# Patient Record
Sex: FEMALE | Race: WHITE
[De-identification: names, ages, dates, MRNs, and addresses within clinical notes are randomized per-mention and may not be internally consistent; named-entity substitution may affect disease eponyms.]

---

## 2018-12-04 ENCOUNTER — HOSPITAL ENCOUNTER (EMERGENCY)
Dept: HOSPITAL 91 - E/R | Age: 58
Discharge: HOME | End: 2018-12-04
Payer: COMMERCIAL

## 2018-12-04 ENCOUNTER — HOSPITAL ENCOUNTER (EMERGENCY)
Age: 58
Discharge: HOME | End: 2018-12-04

## 2018-12-04 DIAGNOSIS — C18.9: ICD-10-CM

## 2018-12-04 DIAGNOSIS — K80.50: Primary | ICD-10-CM

## 2018-12-04 LAB
ADD MAN DIFF?: NO
ALANINE AMINOTRANSFERASE: 63 IU/L (ref 13–69)
ALBUMIN/GLOBULIN RATIO: 1.1
ALBUMIN: 4.4 G/DL (ref 3.3–4.9)
ALKALINE PHOSPHATASE: 107 IU/L (ref 42–121)
ANION GAP: 13 (ref 5–13)
ASPARTATE AMINO TRANSFERASE: 61 IU/L (ref 15–46)
BASOPHIL #: 0.1 10^3/UL (ref 0–0.1)
BASOPHILS %: 0.9 % (ref 0–2)
BILIRUBIN,DIRECT: 0 MG/DL (ref 0–0.2)
BILIRUBIN,TOTAL: 0.8 MG/DL (ref 0.2–1.3)
BLOOD UREA NITROGEN: 10 MG/DL (ref 7–20)
CALCIUM: 9.1 MG/DL (ref 8.4–10.2)
CARBON DIOXIDE: 23 MMOL/L (ref 21–31)
CHLORIDE: 99 MMOL/L (ref 97–110)
CREATININE: 0.66 MG/DL (ref 0.44–1)
EOSINOPHILS #: 0.4 10^3/UL (ref 0–0.5)
EOSINOPHILS %: 3.7 % (ref 0–7)
GLOBULIN: 4 G/DL (ref 1.3–3.2)
GLUCOSE: 143 MG/DL (ref 70–220)
HEMATOCRIT: 42.2 % (ref 37–47)
HEMOGLOBIN: 14.3 G/DL (ref 12–16)
IMMATURE GRANS #M: 0.03 10^3/UL (ref 0–0.03)
IMMATURE GRANS % (M): 0.3 % (ref 0–0.43)
LIPASE: 123 U/L (ref 23–300)
LYMPHOCYTES #: 2.1 10^3/UL (ref 0.8–2.9)
LYMPHOCYTES %: 21.1 % (ref 15–51)
MEAN CORPUSCULAR HEMOGLOBIN: 28 PG (ref 29–33)
MEAN CORPUSCULAR HGB CONC: 33.9 G/DL (ref 32–37)
MEAN CORPUSCULAR VOLUME: 82.7 FL (ref 82–101)
MEAN PLATELET VOLUME: 10.3 FL (ref 7.4–10.4)
MONOCYTE #: 1.2 10^3/UL (ref 0.3–0.9)
MONOCYTES %: 11.9 % (ref 0–11)
NEUTROPHIL #: 6.2 10^3/UL (ref 1.6–7.5)
NEUTROPHILS %: 62.1 % (ref 39–77)
NUCLEATED RED BLOOD CELLS #: 0 10^3/UL (ref 0–0)
NUCLEATED RED BLOOD CELLS%: 0 /100WBC (ref 0–0)
PLATELET COUNT: 308 10^3/UL (ref 140–415)
POTASSIUM: 4.4 MMOL/L (ref 3.5–5.1)
RED BLOOD COUNT: 5.1 10^6/UL (ref 4.2–5.4)
RED CELL DISTRIBUTION WIDTH: 15.3 % (ref 11.5–14.5)
SODIUM: 135 MMOL/L (ref 135–144)
TOTAL PROTEIN: 8.4 G/DL (ref 6.1–8.1)
URINE PH (DIP) POC: 6 (ref 5–8.5)
WHITE BLOOD COUNT: 10 10^3/UL (ref 4.8–10.8)

## 2018-12-04 PROCEDURE — 85025 COMPLETE CBC W/AUTO DIFF WBC: CPT

## 2018-12-04 PROCEDURE — 80053 COMPREHEN METABOLIC PANEL: CPT

## 2018-12-04 PROCEDURE — 99285 EMERGENCY DEPT VISIT HI MDM: CPT

## 2018-12-04 PROCEDURE — 96374 THER/PROPH/DIAG INJ IV PUSH: CPT

## 2018-12-04 PROCEDURE — 96375 TX/PRO/DX INJ NEW DRUG ADDON: CPT

## 2018-12-04 PROCEDURE — 81003 URINALYSIS AUTO W/O SCOPE: CPT

## 2018-12-04 PROCEDURE — 74176 CT ABD & PELVIS W/O CONTRAST: CPT

## 2018-12-04 PROCEDURE — 36415 COLL VENOUS BLD VENIPUNCTURE: CPT

## 2018-12-04 PROCEDURE — 83690 ASSAY OF LIPASE: CPT

## 2018-12-04 RX ADMIN — MORPHINE SULFATE 1 MG: 2 INJECTION, SOLUTION INTRAMUSCULAR; INTRAVENOUS at 23:29

## 2018-12-04 RX ADMIN — MORPHINE SULFATE 1 MG: 2 INJECTION, SOLUTION INTRAMUSCULAR; INTRAVENOUS at 21:56

## 2018-12-04 RX ADMIN — ONDANSETRON HYDROCHLORIDE 1 MG: 2 INJECTION, SOLUTION INTRAMUSCULAR; INTRAVENOUS at 21:56

## 2019-01-05 ENCOUNTER — HOSPITAL ENCOUNTER (INPATIENT)
Dept: HOSPITAL 91 - 6WM | Age: 59
LOS: 5 days | Discharge: HOME HEALTH | DRG: 25 | End: 2019-01-10
Payer: COMMERCIAL

## 2019-01-05 ENCOUNTER — HOSPITAL ENCOUNTER (INPATIENT)
Dept: HOSPITAL 10 - E/R | Age: 59
LOS: 5 days | Discharge: HOME HEALTH | DRG: 25 | End: 2019-01-10
Attending: LEGAL MEDICINE | Admitting: LEGAL MEDICINE
Payer: COMMERCIAL

## 2019-01-05 VITALS — RESPIRATION RATE: 17 BRPM | DIASTOLIC BLOOD PRESSURE: 67 MMHG | HEART RATE: 74 BPM | SYSTOLIC BLOOD PRESSURE: 143 MMHG

## 2019-01-05 VITALS — SYSTOLIC BLOOD PRESSURE: 141 MMHG | RESPIRATION RATE: 19 BRPM | DIASTOLIC BLOOD PRESSURE: 69 MMHG | HEART RATE: 77 BPM

## 2019-01-05 VITALS
BODY MASS INDEX: 30.02 KG/M2 | BODY MASS INDEX: 30.02 KG/M2 | HEIGHT: 62 IN | WEIGHT: 163.14 LBS | HEIGHT: 62 IN | WEIGHT: 163.14 LBS

## 2019-01-05 VITALS — HEART RATE: 83 BPM

## 2019-01-05 VITALS — HEART RATE: 88 BPM

## 2019-01-05 VITALS — DIASTOLIC BLOOD PRESSURE: 69 MMHG | HEART RATE: 84 BPM | SYSTOLIC BLOOD PRESSURE: 145 MMHG | RESPIRATION RATE: 18 BRPM

## 2019-01-05 VITALS — HEART RATE: 92 BPM

## 2019-01-05 DIAGNOSIS — R47.01: ICD-10-CM

## 2019-01-05 DIAGNOSIS — G93.6: ICD-10-CM

## 2019-01-05 DIAGNOSIS — C18.9: ICD-10-CM

## 2019-01-05 DIAGNOSIS — C78.00: ICD-10-CM

## 2019-01-05 DIAGNOSIS — C78.7: ICD-10-CM

## 2019-01-05 DIAGNOSIS — C79.31: Primary | ICD-10-CM

## 2019-01-05 DIAGNOSIS — G81.94: ICD-10-CM

## 2019-01-05 LAB
ADD MAN DIFF?: NO
ALANINE AMINOTRANSFERASE: 40 IU/L (ref 13–69)
ALBUMIN/GLOBULIN RATIO: 1.13
ALBUMIN: 4.2 G/DL (ref 3.3–4.9)
ALKALINE PHOSPHATASE: 80 IU/L (ref 42–121)
ANION GAP: 10 (ref 5–13)
ASPARTATE AMINO TRANSFERASE: 45 IU/L (ref 15–46)
BASOPHIL #: 0.1 10^3/UL (ref 0–0.1)
BASOPHILS %: 0.7 % (ref 0–2)
BILIRUBIN,DIRECT: 0 MG/DL (ref 0–0.2)
BILIRUBIN,TOTAL: 0.2 MG/DL (ref 0.2–1.3)
BLOOD UREA NITROGEN: 6 MG/DL (ref 7–20)
CALCIUM: 8.7 MG/DL (ref 8.4–10.2)
CARBON DIOXIDE: 30 MMOL/L (ref 21–31)
CHLORIDE: 100 MMOL/L (ref 97–110)
CREATININE: 0.7 MG/DL (ref 0.44–1)
EOSINOPHILS #: 0.4 10^3/UL (ref 0–0.5)
EOSINOPHILS %: 5.7 % (ref 0–7)
GLOBULIN: 3.7 G/DL (ref 1.3–3.2)
GLUCOSE: 134 MG/DL (ref 70–220)
HEMATOCRIT: 40.5 % (ref 37–47)
HEMOGLOBIN: 13.4 G/DL (ref 12–16)
IMMATURE GRANS #M: 0.02 10^3/UL (ref 0–0.03)
IMMATURE GRANS % (M): 0.3 % (ref 0–0.43)
INR: 1.02
LYMPHOCYTES #: 1.3 10^3/UL (ref 0.8–2.9)
LYMPHOCYTES %: 17.8 % (ref 15–51)
MEAN CORPUSCULAR HEMOGLOBIN: 28.3 PG (ref 29–33)
MEAN CORPUSCULAR HGB CONC: 33.1 G/DL (ref 32–37)
MEAN CORPUSCULAR VOLUME: 85.6 FL (ref 82–101)
MEAN PLATELET VOLUME: 10.2 FL (ref 7.4–10.4)
MONOCYTE #: 0.8 10^3/UL (ref 0.3–0.9)
MONOCYTES %: 11.3 % (ref 0–11)
NEUTROPHIL #: 4.8 10^3/UL (ref 1.6–7.5)
NEUTROPHILS %: 64.2 % (ref 39–77)
NUCLEATED RED BLOOD CELLS #: 0 10^3/UL (ref 0–0)
NUCLEATED RED BLOOD CELLS%: 0 /100WBC (ref 0–0)
PLATELET COUNT: 307 10^3/UL (ref 140–415)
POTASSIUM: 4.3 MMOL/L (ref 3.5–5.1)
PROTIME: 13.5 SEC (ref 11.9–14.9)
PT RATIO: 1.1
RED BLOOD COUNT: 4.73 10^6/UL (ref 4.2–5.4)
RED CELL DISTRIBUTION WIDTH: 15.1 % (ref 11.5–14.5)
SODIUM: 140 MMOL/L (ref 135–144)
TOTAL PROTEIN: 7.9 G/DL (ref 6.1–8.1)
TROPONIN-I: < 0.012 NG/ML (ref 0–0.12)
WHITE BLOOD COUNT: 7.4 10^3/UL (ref 4.8–10.8)

## 2019-01-05 PROCEDURE — 84100 ASSAY OF PHOSPHORUS: CPT

## 2019-01-05 PROCEDURE — 84484 ASSAY OF TROPONIN QUANT: CPT

## 2019-01-05 PROCEDURE — 85610 PROTHROMBIN TIME: CPT

## 2019-01-05 PROCEDURE — 74177 CT ABD & PELVIS W/CONTRAST: CPT

## 2019-01-05 PROCEDURE — 93005 ELECTROCARDIOGRAM TRACING: CPT

## 2019-01-05 PROCEDURE — 85730 THROMBOPLASTIN TIME PARTIAL: CPT

## 2019-01-05 PROCEDURE — 88313 SPECIAL STAINS GROUP 2: CPT

## 2019-01-05 PROCEDURE — 70498 CT ANGIOGRAPHY NECK: CPT

## 2019-01-05 PROCEDURE — C1713 ANCHOR/SCREW BN/BN,TIS/BN: HCPCS

## 2019-01-05 PROCEDURE — 99285 EMERGENCY DEPT VISIT HI MDM: CPT

## 2019-01-05 PROCEDURE — 87081 CULTURE SCREEN ONLY: CPT

## 2019-01-05 PROCEDURE — 36415 COLL VENOUS BLD VENIPUNCTURE: CPT

## 2019-01-05 PROCEDURE — 93306 TTE W/DOPPLER COMPLETE: CPT

## 2019-01-05 PROCEDURE — 70551 MRI BRAIN STEM W/O DYE: CPT

## 2019-01-05 PROCEDURE — 97161 PT EVAL LOW COMPLEX 20 MIN: CPT

## 2019-01-05 PROCEDURE — 88342 IMHCHEM/IMCYTCHM 1ST ANTB: CPT

## 2019-01-05 PROCEDURE — 71260 CT THORAX DX C+: CPT

## 2019-01-05 PROCEDURE — 82962 GLUCOSE BLOOD TEST: CPT

## 2019-01-05 PROCEDURE — 70553 MRI BRAIN STEM W/O & W/DYE: CPT

## 2019-01-05 PROCEDURE — 70450 CT HEAD/BRAIN W/O DYE: CPT

## 2019-01-05 PROCEDURE — 83735 ASSAY OF MAGNESIUM: CPT

## 2019-01-05 PROCEDURE — 88341 IMHCHEM/IMCYTCHM EA ADD ANTB: CPT

## 2019-01-05 PROCEDURE — 70552 MRI BRAIN STEM W/DYE: CPT

## 2019-01-05 PROCEDURE — 86900 BLOOD TYPING SEROLOGIC ABO: CPT

## 2019-01-05 PROCEDURE — 97164 PT RE-EVAL EST PLAN CARE: CPT

## 2019-01-05 PROCEDURE — 88307 TISSUE EXAM BY PATHOLOGIST: CPT

## 2019-01-05 PROCEDURE — 80048 BASIC METABOLIC PNL TOTAL CA: CPT

## 2019-01-05 PROCEDURE — 88331 PATH CONSLTJ SURG 1 BLK 1SPC: CPT

## 2019-01-05 PROCEDURE — 80053 COMPREHEN METABOLIC PANEL: CPT

## 2019-01-05 PROCEDURE — 85025 COMPLETE CBC W/AUTO DIFF WBC: CPT

## 2019-01-05 PROCEDURE — 86901 BLOOD TYPING SEROLOGIC RH(D): CPT

## 2019-01-05 PROCEDURE — 93970 EXTREMITY STUDY: CPT

## 2019-01-05 PROCEDURE — 97530 THERAPEUTIC ACTIVITIES: CPT

## 2019-01-05 PROCEDURE — 70496 CT ANGIOGRAPHY HEAD: CPT

## 2019-01-05 PROCEDURE — C9113 INJ PANTOPRAZOLE SODIUM, VIA: HCPCS

## 2019-01-05 RX ADMIN — LEVETIRACETAM SCH MLS/HR: 5 INJECTION INTRAVENOUS at 21:49

## 2019-01-05 RX ADMIN — ACETAMINOPHEN 1 MG: 325 TABLET, FILM COATED ORAL at 18:35

## 2019-01-05 RX ADMIN — LEVETIRACETAM 1 MLS/HR: 5 INJECTION INTRAVENOUS at 21:49

## 2019-01-05 RX ADMIN — VASOPRESSIN 1 ML/S: 20 INJECTION, SOLUTION INTRAMUSCULAR; SUBCUTANEOUS at 12:44

## 2019-01-05 RX ADMIN — DEXAMETHASONE SODIUM PHOSPHATE 1 MG: 4 INJECTION, SOLUTION INTRAMUSCULAR; INTRAVENOUS at 18:35

## 2019-01-05 RX ADMIN — SODIUM CHLORIDE 1 ML: 9 INJECTION, SOLUTION INTRAMUSCULAR; INTRAVENOUS; SUBCUTANEOUS at 12:44

## 2019-01-05 RX ADMIN — DEXAMETHASONE SODIUM PHOSPHATE SCH MG: 4 INJECTION, SOLUTION INTRAMUSCULAR; INTRAVENOUS at 18:35

## 2019-01-05 RX ADMIN — IOHEXOL 1 ML/S: 350 INJECTION, SOLUTION INTRAVENOUS at 12:45

## 2019-01-05 NOTE — ERD
ER Documentation


Chief Complaint


Chief Complaint





ha and confusion x 2 to 3 wks. stop chemo ~ 1wks ago. hx of colon ca





HPI


This is a 58-year-old female with a prior history of colon cancer, also with 


history of colostomy bag, who presents with 2 weeks of memory loss and word 


finding difficulty associated with a headache.  She denies head trauma, she has 


not on any blood thinners, she denies vomiting.  There are no alleviating or a


ggravating factors.





ROS


All systems reviewed and are negative except as per history of present illness.





Medications


Home Meds


Discontinued Reported Medications


Multivitamins* (Theragran*) 1 Tab Tab, 1 TAB PO DAILY, TAB


   12/4/18


Regorafenib (STIVARGA) 40 Mg Tablet, 40 MG PO, TAB


   12/4/18


Discontinued Scripts


Hydrocodone/Acetaminophen (Norco 5-325 Tablet) 1 Each Tablet, 1 TAB PO Q6H PRN 


for PAIN, #7 TAB


   Prov:TAB SALGADO MD         12/4/18





Allergies


Allergies:  


Coded Allergies:  


     No Known Allergy (Unverified , 1/5/19)





PMhx/Soc


History of Surgery:  Yes (TAHBSO, Ileostomy)


Anesthesia Reaction:  No


Hx Neurological Disorder:  No


Hx Respiratory Disorders:  Yes (Lung Cancer)


Hx Cardiac Disorders:  No


Hx Psychiatric Problems:  No


Hx Miscellaneous Medical Probl:  Yes (Liver & Colon Cancer)


Hx Alcohol Use:  No


Hx Substance Use:  No


Hx Tobacco Use:  No


Smoking Status:  Never smoker





Physical Exam


Vitals





Vital Signs


  Date      Temp  Pulse  Resp  B/P (MAP)   Pulse Ox  O2          O2 Flow    FiO2


Time                                                 Delivery    Rate


    1/5/19  98.0     86    16      146/75        98  Room Air


     13:12                           (98)


    1/5/19  97.4     71    18      138/74        98


     10:58                           (95)





Physical Exam


Const:   No acute distress


Head:   Atraumatic 


Eyes:    Normal Conjunctiva


ENT:    Normal External Ears, Nose and Mouth.


Neck:               Full range of motion. No meningismus.


Resp:   Clear to auscultation bilaterally


Cardio:   Regular rate and rhythm, no murmurs


Abd:    Soft, non tender, non distended. Normal bowel sounds


Skin:   No petechiae or rashes


Back:   No midline or flank tenderness


Ext:    No cyanosis, or edema


Neur:   Awake and alert, cranial nerves II through XII intact, no cerebellar 


ataxia, patient has difficulty with memory recently


Psych:    Normal Mood and Affect


Result Diagram:  


1/5/19 1151                                                                     


          1/5/19 1151





Results 24 hrs





Laboratory Tests


              Test
                                  1/5/19
11:51


              White Blood Count                      7.4 10^3/ul


              Red Blood Count                       4.73 10^6/ul


              Hemoglobin                               13.4 g/dl


              Hematocrit                                  40.5 %


              Mean Corpuscular Volume                    85.6 fl


              Mean Corpuscular Hemoglobin                28.3 pg


              Mean Corpuscular Hemoglobin
Concent     33.1 g/dl 



              Red Cell Distribution Width                 15.1 %


              Platelet Count                         307 10^3/UL


              Mean Platelet Volume                       10.2 fl


              Immature Granulocytes %                    0.300 %


              Neutrophils %                               64.2 %


              Lymphocytes %                               17.8 %


              Monocytes %                                 11.3 %


              Eosinophils %                                5.7 %


              Basophils %                                  0.7 %


              Nucleated Red Blood Cells %            0.0 /100WBC


              Immature Granulocytes #              0.020 10^3/ul


              Neutrophils #                          4.8 10^3/ul


              Lymphocytes #                          1.3 10^3/ul


              Monocytes #                            0.8 10^3/ul


              Eosinophils #                          0.4 10^3/ul


              Basophils #                            0.1 10^3/ul


              Nucleated Red Blood Cells #            0.0 10^3/ul


              Prothrombin Time                          13.5 Sec


              Prothrombin Time Ratio                         1.1


              INR International Normalized
Ratio           1.02 



              Sodium Level                            140 mmol/L


              Potassium Level                         4.3 mmol/L


              Chloride Level                          100 mmol/L


              Carbon Dioxide Level                     30 mmol/L


              Anion Gap                                       10


              Blood Urea Nitrogen                        6 mg/dl


              Creatinine                              0.70 mg/dl


              Est Glomerular Filtrat Rate
mL/min   > 60 mL/min 



              Glucose Level                            134 mg/dl


              Calcium Level                            8.7 mg/dl


              Total Bilirubin                          0.2 mg/dl


              Direct Bilirubin                        0.00 mg/dl


              Indirect Bilirubin                       0.2 mg/dl


              Aspartate Amino Transf
(AST/SGOT)         45 IU/L 



              Alanine Aminotransferase
(ALT/SGPT)       40 IU/L 



              Alkaline Phosphatase                       80 IU/L


              Troponin I                           < 0.012 ng/ml


              Total Protein                             7.9 g/dl


              Albumin                                   4.2 g/dl


              Globulin                                 3.70 g/dl


              Albumin/Globulin Ratio                        1.13





Current Medications


 Medications
   Dose
          Sig/Gold
       Start Time
   Status  Last


 (Trade)       Ordered        Route
 PRN     Stop Time              Admin
Dose


                              Reason                                Admin


 IV Flush
      10 ml          STK-MED        1/5/19        DC            1/5/19


(NS 10 ml)                    ONCE
 .ROUTE
  12:35
 1/5/19                12:44



                                             12:36


 Sodium         100 ml @ ud    STK-MED        1/5/19        DC            1/5/19


Chloride                      ONCE
 .ROUTE
  12:35
 1/5/19                12:44



                                             12:36


 Iohexol        100 ml @ ud    STK-MED        1/5/19        DC            1/5/19


                              ONCE
 .ROUTE
  12:35
 1/5/19                12:45



                                             12:36








Procedures/MDM


This is a 58-year-old female who presents for evaluation of headache, as well as


word finding difficulty and memory loss.  Patient had some memory loss noted, 


however she otherwise had no neurologic was otherwise medically stable.  Her lab


workup was unremarkable, however her CT brain did show a intracranial mass with 


mass-effect, given her history of colon cancer it is highly suspicious for 


metastatic disease.   I discussed the case with Dr. Mcfarland and she will be 


admitted to telemetry.  Discussed case with the patient and her sister.  Patient


has remained he medically stable in the ED.


I explained the results of her findings, and all questions were answered.





EKG: 


Rate/Rhythm:       Normal Sinus Rhythm


QRS, ST, T-waves:    No changes consistent w/ acute ischemia


Impression:      No evidence of ischemia or arrhythmia





Departure


Diagnosis:  


   Primary Impression:  


   Headache


   Headache type:  unspecified  Headache chronicity pattern:  acute headache  


   Intractability:  not intractable  Qualified Codes:  R51 - Headache


   Additional Impressions:  


   Intracranial mass


   History of colon cancer


Condition:  Stable











MOUNIKA MORENO MD                Jan 5, 2019 13:32

## 2019-01-06 VITALS — HEART RATE: 68 BPM | SYSTOLIC BLOOD PRESSURE: 133 MMHG | DIASTOLIC BLOOD PRESSURE: 65 MMHG | RESPIRATION RATE: 18 BRPM

## 2019-01-06 VITALS — DIASTOLIC BLOOD PRESSURE: 74 MMHG | HEART RATE: 81 BPM | SYSTOLIC BLOOD PRESSURE: 142 MMHG | RESPIRATION RATE: 18 BRPM

## 2019-01-06 VITALS — SYSTOLIC BLOOD PRESSURE: 120 MMHG | RESPIRATION RATE: 18 BRPM | HEART RATE: 60 BPM | DIASTOLIC BLOOD PRESSURE: 62 MMHG

## 2019-01-06 VITALS — HEART RATE: 65 BPM

## 2019-01-06 VITALS — HEART RATE: 65 BPM | SYSTOLIC BLOOD PRESSURE: 139 MMHG | RESPIRATION RATE: 17 BRPM | DIASTOLIC BLOOD PRESSURE: 71 MMHG

## 2019-01-06 VITALS — HEART RATE: 62 BPM

## 2019-01-06 VITALS — HEART RATE: 75 BPM

## 2019-01-06 VITALS — HEART RATE: 71 BPM | DIASTOLIC BLOOD PRESSURE: 66 MMHG | RESPIRATION RATE: 18 BRPM | SYSTOLIC BLOOD PRESSURE: 133 MMHG

## 2019-01-06 VITALS — HEART RATE: 78 BPM

## 2019-01-06 LAB — PARTIAL THROMBOPLASTIN TIME: 26.9 SEC (ref 23–35)

## 2019-01-06 RX ADMIN — DEXAMETHASONE SODIUM PHOSPHATE 1 MG: 4 INJECTION, SOLUTION INTRAMUSCULAR; INTRAVENOUS at 06:21

## 2019-01-06 RX ADMIN — DEXAMETHASONE SODIUM PHOSPHATE SCH MG: 4 INJECTION, SOLUTION INTRAMUSCULAR; INTRAVENOUS at 14:01

## 2019-01-06 RX ADMIN — LEVETIRACETAM 1 MLS/HR: 5 INJECTION INTRAVENOUS at 08:34

## 2019-01-06 RX ADMIN — IOHEXOL 1 ML: 300 INJECTION, SOLUTION INTRAVENOUS at 21:54

## 2019-01-06 RX ADMIN — VASOPRESSIN 1: 20 INJECTION, SOLUTION INTRAMUSCULAR; SUBCUTANEOUS at 21:54

## 2019-01-06 RX ADMIN — DEXAMETHASONE SODIUM PHOSPHATE SCH MG: 4 INJECTION, SOLUTION INTRAMUSCULAR; INTRAVENOUS at 06:21

## 2019-01-06 RX ADMIN — LEVETIRACETAM SCH MLS/HR: 5 INJECTION INTRAVENOUS at 08:34

## 2019-01-06 RX ADMIN — DEXAMETHASONE SODIUM PHOSPHATE 1 MG: 4 INJECTION, SOLUTION INTRAMUSCULAR; INTRAVENOUS at 14:01

## 2019-01-06 RX ADMIN — DEXAMETHASONE SODIUM PHOSPHATE SCH MG: 4 INJECTION, SOLUTION INTRAMUSCULAR; INTRAVENOUS at 00:21

## 2019-01-06 RX ADMIN — DEXAMETHASONE SODIUM PHOSPHATE SCH MG: 4 INJECTION, SOLUTION INTRAMUSCULAR; INTRAVENOUS at 18:11

## 2019-01-06 RX ADMIN — LEVETIRACETAM SCH MLS/HR: 5 INJECTION INTRAVENOUS at 20:52

## 2019-01-06 RX ADMIN — DEXAMETHASONE SODIUM PHOSPHATE 1 MG: 4 INJECTION, SOLUTION INTRAMUSCULAR; INTRAVENOUS at 18:11

## 2019-01-06 RX ADMIN — LEVETIRACETAM 1 MLS/HR: 5 INJECTION INTRAVENOUS at 20:52

## 2019-01-06 RX ADMIN — SODIUM CHLORIDE 1 ML: 9 INJECTION, SOLUTION INTRAMUSCULAR; INTRAVENOUS; SUBCUTANEOUS at 21:54

## 2019-01-06 RX ADMIN — DEXAMETHASONE SODIUM PHOSPHATE 1 MG: 4 INJECTION, SOLUTION INTRAMUSCULAR; INTRAVENOUS at 00:21

## 2019-01-06 NOTE — HP
DATE OF ADMISSION: 01/05/2019

 

CHIEF COMPLAINT:  Altered mental status.

 

HISTORY OF PRESENT ILLNESS:  Ms. Santos presents to the emergency room at [facility] with al
teration of mental status.  Upon further questioning, this actually means word finding difficulties a
nd difficulty with speech.  She seems to understand questions quite well; however, does not seem to b
e able to find the right words to answer them.  I am not able to obtain additional history at this ti
me.  Calls to family have not been answered and patient is unable to participate.

 

PAST MEDICAL HISTORY:  Apparently significant for colon cancer.

 

MEDICATIONS:  Outpatient nil.

 

ALLERGIES:  NONE KNOWN.

 

SOCIAL HISTORY:  The patient lives in [place] with her sons.  Unknown tobacco, alcohol status
.

 

FAMILY HISTORY:  Unknown.

 

REVIEW OF SYSTEMS:  Unobtainable secondary to patient's status.

 

PHYSICAL EXAMINATION:

VITAL SIGNS:  Blood pressure 120/62, pulse rate 60, respirations 18, temperature is 98.6.

GENERAL:  Pleasant woman in no acute distress, alert, answers questions, though she does not give the
 correct answer usually.

HEENT:  Normocephalic, atraumatic without evident scleral icterus, perioral cyanosis.  Mucous membran
es are moist.

NECK:  Soft and supple without masses.  No evidence of jugular venous distention or carotid bruits.

CHEST:  Clear to auscultation and percussion bilaterally.

HEART:  Regular rate and rhythm, S1-S2, no added sounds.

ABDOMEN:  Soft, nontender, nondistended without palpable hepatosplenomegaly.

EXTREMITIES:  Without clubbing, cyanosis or edema.

SKIN:  Without rashes.

NEUROLOGIC:  Grossly cranial nerves III through XII are intact.  Tone, power coordination intact x4 l
imbs.

 

LABORATORY STUDIES:  Reveal a hemoglobin of 13.4 g/dL, white count of 7400, platelets of 307,000.  IN
R is 1.0.  Sodium 140, potassium 4.3, chloride 100, bicarbonate 30, BUN 6, creatinine 0.7, glucose 13
4.  Liver function tests are unremarkable.  Troponin is negative.  A CT scan of brain reveals a large
 4 x 3 x 3.5 cm solid hyperdense mass in the left parietal lobe with large amount of vasogenic edema.
  This finding is confirmed on MRI. There is approximately 0.5 cm of left to right shift.

 

ASSESSMENT AND PLAN: Neurologic:  Patient with large mass inside the brain, possible metastatic disea
se.

 

Plan to obtain neurosurgical evaluation for possible biopsy and/or resection.  Begin Keppra, Decadron
 and await neurosurgical evaluation.

 

 

Dictated By: ABHILASH BECKFORD MD

 

RER/NTS

DD:    01/06/2019 08:37:12

DT:    01/06/2019 11:14:54

Conf#: [id number]

DID#:  [id number]

CC: KELSIE CINTRON MD;*EndCC*

## 2019-01-06 NOTE — CONS
Date/Time of Note


Date/Time of Note


DATE: 1/6/19 


TIME: 20:18





Assessment/Plan


This 59 yo female with metastatic colorectal cancer with multiple lung and liver


 metastasis who recently was changed to fourth line treatment as an outpatient 


to [facility].  She presented with altered mental status and word finding 


difficulties and found to have a large brain metastatic lesion with edema.  She 


was evaluated by neurosurgery and plan is for surgical resection.  In general, 


she has a poor prognosis with limited treatment options, all palliative. Per her


outpatient oncologist, hospice care was suggested but she did not accept the 


option and she wishes to pursue further treatment. Patient was away for a study,


but will discuss overall prognosis and goals of care with her during the week. 


Recommend palliative care evaluation as well.


Result Diagram:  


1/5/19 1151                                                                     


          1/5/19 1151





Results 24hrs





Laboratory Tests


               Test
                                1/6/19
13:47


               Activated Partial
Thromboplast Time       26.9  









Consultation Date/Type/Reason


Admit Date/Time


Jan 5, 2019 at 13:08


Date of Consultation:  Jan 6, 2019


Reason for Consultation


metastatic colorectal cancer





Hx of Present Illness


58 year old female with metastatic colorectal cancer with multiple lung and 


liver metastasis, who presented with altered mental status and word finding 


difficulties.  As an outpatient ,she is followed by Dr. Eula Yip and recently 


was transitioned from [facility] to [facility], as fourth line treatment, due to 


recent imaging showing progression of disease.  She had imaging on admission and


MRI brain showed a 3.5 cm x 3.3 cm T2 inhomogeneous hyorintensity/T1 


inhomogeneous hyperintensity mass, likely hemorrhagic metastatic, with large 


surrounding vasogenic edema in the left parietotemporal lobe, associated with 


0.5 cm midline shift toward the right. She was started on steroids and keppra 


for seizure prophylaxis.  Plan is for resection of the mass by neurosurgery.


patient was away for mri





Past Medical History


Medical History:  no pertinent history


Medications





Current Medications


Levetiracetam 100 ml @  400 mls/hr Q12 IVPB  Last administered on 1/6/19at 


08:34; Admin Dose 400 MLS/HR;  Start 1/5/19 at 21:00


Dexamethasone (Decadron) 4 mg Q6 IV  Last administered on 1/6/19at 18:11; Admin 


Dose 4 MG;  Start 1/5/19 at 18:00


Acetaminophen (Tylenol Tab) 650 mg Q4H  PRN PO MILD PAIN(1-3)OR ELEVATED TEMP 


Last administered on 1/5/19at 18:35; Admin Dose 650 MG;  Start 1/5/19 at 16:00


Ondansetron HCl (Zofran Inj) 4 mg Q4H  PRN IV NAUSEA AND/OR VOMITING;  Start 


1/5/19 at 16:00


Hydralazine HCl (Apresoline) 25 mg Q6H  PRN PO sbp>160;  Start 1/5/19 at 16:00


Morphine Sulfate (morphine SULFATE (PF)) 2 mg Q2H  PRN IV SEVERE PAIN LEVEL 7-


10;  Start 1/5/19 at 16:30


Pantoprazole (Protonix Iv) 40 mg DAILY@06 IV ;  Start 1/7/19 at 06:00


Allergies:  


Coded Allergies:  


     No Known Allergy (Unverified , 1/5/19)





Past Surgical History


colon surgery





Family History


Significant Family History:  no pertinent family hx





Social History


Alcohol Use:  none


Smoking Status:  Never smoker


Drug Use:  none





Exam/Review of Systems


Vital Signs


Vitals





Vital Signs


  Date      Temp  Pulse  Resp  B/P (MAP)   Pulse Ox  O2          O2 Flow    FiO2


Time                                                 Delivery    Rate


    1/6/19           75


     16:00


    1/6/19  98.5           18      133/65        97


     15:31                           (87)


    1/5/19                                           Room Air


     14:50








Intake and Output





1/5/19 1/5/19 1/6/19





1515:00


23:00


07:00





IntakeIntake Total


100 ml


400 ml





BalanceBalance


100 ml


400 ml














Exam


patient unable to be examined as away at MRI





Medications


Medications





Current Medications


Levetiracetam 100 ml @  400 mls/hr Q12 IVPB  Last administered on 1/6/19at 


08:34; Admin Dose 400 MLS/HR;  Start 1/5/19 at 21:00


Dexamethasone (Decadron) 4 mg Q6 IV  Last administered on 1/6/19at 18:11; Admin 


Dose 4 MG;  Start 1/5/19 at 18:00


Acetaminophen (Tylenol Tab) 650 mg Q4H  PRN PO MILD PAIN(1-3)OR ELEVATED TEMP 


Last administered on 1/5/19at 18:35; Admin Dose 650 MG;  Start 1/5/19 at 16:00


Ondansetron HCl (Zofran Inj) 4 mg Q4H  PRN IV NAUSEA AND/OR VOMITING;  Start 


1/5/19 at 16:00


Hydralazine HCl (Apresoline) 25 mg Q6H  PRN PO sbp>160;  Start 1/5/19 at 16:00


Morphine Sulfate (morphine SULFATE (PF)) 2 mg Q2H  PRN IV SEVERE PAIN LEVEL 7-


10;  Start 1/5/19 at 16:30


Pantoprazole (Protonix Iv) 40 mg DAILY@06 IV ;  Start 1/7/19 at 06:00





Imaging


Imaging


                                        


PROCEDURE:   MRI brain without IV contrast.


 


CLINICAL INDICATION:  Headache/altered mental status/weakness. 


 


TECHNIQUE:   MRI examination of the brain was performed on a 3T high field MR 


scanner, following pulse sequences:  T1-weighted sagittal, axial, axial FLAIR, 


T2-weighted axial, coronal GRE sequence images are made including the diffusion 


images with ADC map. 


 


COMPARISON:  CT brain, 01/05/2019. 


 


FINDINGS:


 


There is 3.5 cm x 3.3 cm T2 inhomogeneous hyorintensity/T1 inhomogeneous 


hyperintensity mass, likely hemorrhagic metastatic, with large surrounding 


vasogenic edema in the left parietotemporal lobe, associated with 0.5 cm midline


shift toward the right.


 


The ventricles are normal in size.  The diffusion images show no evidence of 


acute ischemia or recent infarct. 


There is no other abnormal intra-axial high or lower signal intensity lesion, 


suggesting tumor, infarct, bleeding, av malformation or inflammatory lesion. 


 


No subdural or epidural hematoma.  The pituitary gland is normal.  The 


pontomesencephalic angle is normal.  The lateral ventricular angle is also 


normal.  The cerebellopontine angle cisterns are clear.  There are signal voids 


of the intracranial arteries and dural sinuses, indicating patency.   The 


visualized paranasal sinuses, orbits and temporal bones are also unremarkable. 


 


IMPRESSION:


 


1.   3.5 cm x 3.3 cm T2 inhomogeneous hyorintensity/T1 inhomogeneous 


hyperintensity mass, likely hemorrhagic metastatic, with large surrounding 


vasogenic edema in the left parietotemporal lobe, associated with 0.5 cm midline


shift toward the right.











GABRIELLA HERNANDEZ MD               Jan 6, 2019 20:30

## 2019-01-06 NOTE — CONS
Date/Time of Note


Date/Time of Note


DATE: 1/6/19 


TIME: 22:13





Assessment/Plan


Date of consultation: 1/6/2019





Requesting physician: Dr. Jaime Dangelo





Consulting service: Neurosurgery





This is a 58-year-old right-handed female who was apparently diagnosed with 


colorectal cancer about a year and a half ago.  The patient has undergone ex 


lap, bowel diversion (colostomy versus ileostomypatient's family unsure which 


one) and has undergone chemotherapy.  The patient was apparently recently found 


to have mass is felt to be metastatic in her lungs and the pelvic area and is 


currently being followed up by her oncologist on an outpatient basis.  A further


treatment plan is apparently being formulated by her oncologist but the family 


is unaware of the details.  The patient is brought in by her family for 


progressive difficulty with speech more significantly involving word finding as 


well as expressive speech over the past 1-2 weeks.  The patient's family 


including 1 of her sons and her daughter-in-law who seem to be the most involved


and the mother's care tell me that the patient has been showing signs of speech 


difficulty probably for the past month or so.  They also feel that the mother 


has become somewhat disoriented.  They are not sure as to whether the mother has


shown any weakness of her upper or lower extremities.  She is able to ambulate. 


She has not had any convulsions, seizures or loss of consciousness.





The above history is obtained mainly from the patient's son and his wife (the 


patient's daughter-in-law).  The patient herself is unable to provide any his


tory.








Past medical history: See above





Allergies:No known drug allergies





Review of systems: Cannot be obtained from the patient herself due to aphasia





Family history: Not contributory





Social history: Denies smoking tobacco, EtOH, illicit or recreational drugs.





Physical examination:  This is a middle-age female sitting upright in the 


hospital bed next to her many family members in the room including her son and 


her daughter-in-law.    The patient is in no apparent distress.  She is awake an


d alert.  She appears to be mainly Yi-speaking.  She can say her first and 


last name but her language is not fully fluent.  When her son asks her about the


year, she says December.  She has difficulty understanding other questions 


including where she is at.  Face is symmetric.  Extraocular movements are 


grossly normal.  Pupils are equally round and reactive to light bilaterally.  


She is able to follow one-step commands including moving her upper and lower 


extremities.  





Tongue is midline.   Shoulder shrugs are symmetric.  Visual field testing is 


difficult as the patient is not fully cooperative part of which may be related 


to receptive aphasia.  





Muscle bulk and tone is normal bilateral upper and lower extremities.  Deep 


tendon reflexes are 1+ bilateral upper and lower extremities.  Sensation to 


light touch seems to be grossly normal bilateral upper and lower extremities.  


Motor strength is at least 4 out of 5 bilateral upper and lower extremities.





There is no Jake sign present bilaterally.   Toes are downgoing bilaterally.


 There is a probable right pronator drift although the patient is also not fully


cooperative with the exam.





She is able to ambulate without assistance but has a wide gait.  She has 


difficulty doing a tandem gait.








Imaging:Imaging: CT brain without contrast: There is a large left frontoparietal


temporal hyperdensity likely to be an intraparenchymal mass with surrounding 


vasogenic edema.  There is several millimeter left-to-right midline shift.  


Basal cisterns are open.





MRI of brain without contrast: There is again a large left frontoparietal 


temporal mass with significant surrounding vasogenic edema with local mass-


effect.  There is no evidence of hydrocephalus.  The basal cisterns are open.








Assessment/plan: I have reviewed the above image findings in great detail with 


the patient (as best that she can understand with the help of her family who are


translating for me) and her family.  The reason for the patient's aphasia that 


appears to be both expressive and receptive in nature is likely related to the 


large left intraparenchymal left frontoparietal tumor with surrounding vasogenic


edema that would affect her speech areas.  Given the fact that the patient has 


already been shown to have metastatic colorectal cancer, it is likely that the 


intraparenchymal brain tumor is also metastatic in nature.  But there is also a 


possibility that this mass may be a primary brain tumor such as a glioma.  Given


the fact that this appears to be a solitary brain tumor, and the fact that the 


tumor appears to come right to the surface of the brain, it is possible to 


resect this tumor or at least obtain specimen for biopsy and debulking.  The 


surgery may be able to help with the patient's neurologic symptomatology 


although given the fact that the tumor is on the patient's dominant side it is 


also possible to worsen the patient's aphasia.  It Is also very likely that the 


patient already has a right-sided hemianopsia due to the location of the tumor 


where the optic tract travels through but due to the patient's overall condition


it is difficult to do a reliable visual field test.  Tumor resection can lead to


further worsening of visual field defect.  I would like the patient to be 


evaluated by oncology for overall assessment of her oncologic status and a 


possible overall prognosis.  In addition, I would recommend an MRI of the brain 


with contrast for further evaluation of the tumor using stereotactic protocol.  


Once the patient is evaluated by oncology I will again discuss further possible 


neurosurgical treatment options with the patient and her family.  The patient 


has really been started on corticosteroid treatment.


Result Diagram:  


1/5/19 1151                                                                     


          1/5/19 1151





Results 24hrs





Laboratory Tests


               Test
                                1/6/19
13:47


               Activated Partial
Thromboplast Time       26.9  















KELSIE CINTRON MD                Jan 6, 2019 22:13

## 2019-01-06 NOTE — NUR
RN NOTE 

NO COMPLAIN OF PAIN OR DISCOMFORT . RESULT OF MRI WITH CONTRAST IS PENDING , WAITING TO TAKE 
THE PT FOT CT WITH CONTRAST .

## 2019-01-06 NOTE — QN
Documentation


Comment


H&P dict





a/p


1. large mass in brain presumed met from colon ca, await neurosurg and neuro 


eval











ABHILASH BECKFORD MD          Jan 6, 2019 08:31

## 2019-01-06 NOTE — NUR
END OF SHIFT RN NOTE

PATIENT ALERT AND ORIENTED X 3, FORGETFUL, DENIED PAIN, AMBULATES WITH STANDBY ASSIST.  MEDS 
GIVEN AS SCHEDULED, PATIENT SLEPT WELL LAST NIGHT.  BED IN THE LOWEST POSITION WITH BRAKES 
ENGAGED,  HOURLY ROUNDING IN PLACE, CALL LIGHT AND PERSONAL ITEMS WITHIN EASY REACH. WILL 
ENDORSE TO DAY SHIFT RN.

## 2019-01-06 NOTE — PN
Date/Time of Note


Date/Time of Note


DATE: 1/6/19 


TIME: 12:15





Assessment/Plan


VTE Prophylaxis


Risk score (from Ns)>0 risk:  1


SCD applied (from Ns):  No


SCD contraindicated:  other


Pharmacological prophylaxis:  NA/contraindicated


Pharm contraindication:  other





Lines/Catheters


IV Catheter Type (from Nrsg):  Saline Lock





Assessment/Plan


Assessment/Plan


1. large brain mass, suspect met, await neurosurg opinion





case df/wq dr alcaraz


Result Diagram:  


1/5/19 1151                                                                     


          1/5/19 1151








Subjective


24 Hr Interval Summary


Free Text/Dictation


no new c/o await neuro surg





Exam/Review of Systems


Vital Signs


Vitals





Vital Signs


  Date      Temp  Pulse  Resp  B/P (MAP)   Pulse Ox  O2          O2 Flow    FiO2


Time                                                 Delivery    Rate


    1/6/19  98.1     65    17      139/71        97


     11:25                           (93)


    1/5/19                                           Room Air


     14:50








Intake and Output





1/5/19 1/5/19 1/6/19





1515:00


23:00


07:00





IntakeIntake Total


100 ml


400 ml





BalanceBalance


100 ml


400 ml














Exam


nad, ctab, rrr





Medications


Medications





Current Medications


Levetiracetam 100 ml @  400 mls/hr Q12 IVPB  Last administered on 1/6/19at 


08:34; Admin Dose 400 MLS/HR;  Start 1/5/19 at 21:00


Dexamethasone (Decadron) 4 mg Q6 IV  Last administered on 1/6/19at 06:21; Admin 


Dose 4 MG;  Start 1/5/19 at 18:00


Acetaminophen (Tylenol Tab) 650 mg Q4H  PRN PO MILD PAIN(1-3)OR ELEVATED TEMP 


Last administered on 1/5/19at 18:35; Admin Dose 650 MG;  Start 1/5/19 at 16:00


Ondansetron HCl (Zofran Inj) 4 mg Q4H  PRN IV NAUSEA AND/OR VOMITING;  Start 


1/5/19 at 16:00


Hydralazine HCl (Apresoline) 25 mg Q6H  PRN PO sbp>160;  Start 1/5/19 at 16:00


Morphine Sulfate (morphine SULFATE (PF)) 2 mg Q2H  PRN IV SEVERE PAIN LEVEL 7-


10;  Start 1/5/19 at 16:30











ABHILASH BECKFORD MD          Jan 6, 2019 12:17

## 2019-01-07 VITALS — SYSTOLIC BLOOD PRESSURE: 131 MMHG | DIASTOLIC BLOOD PRESSURE: 64 MMHG | HEART RATE: 57 BPM | RESPIRATION RATE: 18 BRPM

## 2019-01-07 VITALS — RESPIRATION RATE: 18 BRPM | SYSTOLIC BLOOD PRESSURE: 130 MMHG | DIASTOLIC BLOOD PRESSURE: 62 MMHG | HEART RATE: 64 BPM

## 2019-01-07 VITALS — HEART RATE: 64 BPM

## 2019-01-07 VITALS — SYSTOLIC BLOOD PRESSURE: 134 MMHG | DIASTOLIC BLOOD PRESSURE: 72 MMHG | RESPIRATION RATE: 19 BRPM | HEART RATE: 70 BPM

## 2019-01-07 VITALS — SYSTOLIC BLOOD PRESSURE: 114 MMHG | RESPIRATION RATE: 18 BRPM | HEART RATE: 56 BPM | DIASTOLIC BLOOD PRESSURE: 58 MMHG

## 2019-01-07 VITALS — RESPIRATION RATE: 19 BRPM | DIASTOLIC BLOOD PRESSURE: 56 MMHG | SYSTOLIC BLOOD PRESSURE: 116 MMHG | HEART RATE: 64 BPM

## 2019-01-07 VITALS — HEART RATE: 54 BPM | SYSTOLIC BLOOD PRESSURE: 121 MMHG | DIASTOLIC BLOOD PRESSURE: 58 MMHG | RESPIRATION RATE: 19 BRPM

## 2019-01-07 VITALS — HEART RATE: 55 BPM

## 2019-01-07 VITALS — HEART RATE: 63 BPM

## 2019-01-07 VITALS — HEART RATE: 74 BPM

## 2019-01-07 VITALS — HEART RATE: 57 BPM

## 2019-01-07 LAB
ADD MAN DIFF?: NO
ALANINE AMINOTRANSFERASE: 34 IU/L (ref 13–69)
ALBUMIN/GLOBULIN RATIO: 1.13
ALBUMIN: 4.3 G/DL (ref 3.3–4.9)
ALKALINE PHOSPHATASE: 91 IU/L (ref 42–121)
ANION GAP: 11 (ref 5–13)
ASPARTATE AMINO TRANSFERASE: 37 IU/L (ref 15–46)
BASOPHIL #: 0 10^3/UL (ref 0–0.1)
BASOPHILS %: 0.1 % (ref 0–2)
BILIRUBIN,DIRECT: 0 MG/DL (ref 0–0.2)
BILIRUBIN,TOTAL: 0.1 MG/DL (ref 0.2–1.3)
BLOOD UREA NITROGEN: 12 MG/DL (ref 7–20)
CALCIUM: 9.2 MG/DL (ref 8.4–10.2)
CARBON DIOXIDE: 27 MMOL/L (ref 21–31)
CHLORIDE: 100 MMOL/L (ref 97–110)
CREATININE: 0.66 MG/DL (ref 0.44–1)
EOSINOPHILS #: 0 10^3/UL (ref 0–0.5)
EOSINOPHILS %: 0 % (ref 0–7)
GLOBULIN: 3.8 G/DL (ref 1.3–3.2)
GLUCOSE: 314 MG/DL (ref 70–220)
HEMATOCRIT: 40.6 % (ref 37–47)
HEMOGLOBIN: 13.3 G/DL (ref 12–16)
IMMATURE GRANS #M: 0.11 10^3/UL (ref 0–0.03)
IMMATURE GRANS % (M): 0.6 % (ref 0–0.43)
LYMPHOCYTES #: 1 10^3/UL (ref 0.8–2.9)
LYMPHOCYTES %: 5.6 % (ref 15–51)
MEAN CORPUSCULAR HEMOGLOBIN: 28.3 PG (ref 29–33)
MEAN CORPUSCULAR HGB CONC: 32.8 G/DL (ref 32–37)
MEAN CORPUSCULAR VOLUME: 86.4 FL (ref 82–101)
MEAN PLATELET VOLUME: 10.6 FL (ref 7.4–10.4)
MONOCYTE #: 0.9 10^3/UL (ref 0.3–0.9)
MONOCYTES %: 5 % (ref 0–11)
NEUTROPHIL #: 15.6 10^3/UL (ref 1.6–7.5)
NEUTROPHILS %: 88.7 % (ref 39–77)
NUCLEATED RED BLOOD CELLS #: 0 10^3/UL (ref 0–0)
NUCLEATED RED BLOOD CELLS%: 0 /100WBC (ref 0–0)
PLATELET COUNT: 385 10^3/UL (ref 140–415)
POTASSIUM: 3.9 MMOL/L (ref 3.5–5.1)
RED BLOOD COUNT: 4.7 10^6/UL (ref 4.2–5.4)
RED CELL DISTRIBUTION WIDTH: 15.5 % (ref 11.5–14.5)
SODIUM: 138 MMOL/L (ref 135–144)
TOTAL PROTEIN: 8.1 G/DL (ref 6.1–8.1)
WHITE BLOOD COUNT: 17.6 10^3/UL (ref 4.8–10.8)

## 2019-01-07 RX ADMIN — LEVETIRACETAM 1 MLS/HR: 5 INJECTION INTRAVENOUS at 20:50

## 2019-01-07 RX ADMIN — DEXAMETHASONE SODIUM PHOSPHATE SCH MG: 4 INJECTION, SOLUTION INTRAMUSCULAR; INTRAVENOUS at 01:25

## 2019-01-07 RX ADMIN — PANTOPRAZOLE SODIUM 1 MG: 40 INJECTION, POWDER, FOR SOLUTION INTRAVENOUS at 06:34

## 2019-01-07 RX ADMIN — ASCORBIC ACID, VITAMIN A PALMITATE, CHOLECALCIFEROL, THIAMINE HYDROCHLORIDE, RIBOFLAVIN-5 PHOSPHATE SODIUM, PYRIDOXINE HYDROCHLORIDE, NIACINAMIDE, DEXPANTHENOL, ALPHA-TOCOPHEROL ACETATE, VITAMIN K1, FOLIC ACID, BIOTIN, CYANOCOBALAMIN 1 MLS/HR: 200; 3300; 200; 6; 3.6; 6; 40; 15; 10; 150; 600; 60; 5 INJECTION, SOLUTION INTRAVENOUS at 18:35

## 2019-01-07 RX ADMIN — LEVETIRACETAM SCH MLS/HR: 5 INJECTION INTRAVENOUS at 20:50

## 2019-01-07 RX ADMIN — DEXAMETHASONE SODIUM PHOSPHATE 1 MG: 4 INJECTION, SOLUTION INTRAMUSCULAR; INTRAVENOUS at 11:58

## 2019-01-07 RX ADMIN — DEXAMETHASONE SODIUM PHOSPHATE 1 MG: 4 INJECTION, SOLUTION INTRAMUSCULAR; INTRAVENOUS at 01:25

## 2019-01-07 RX ADMIN — DEXAMETHASONE SODIUM PHOSPHATE 1 MG: 4 INJECTION, SOLUTION INTRAMUSCULAR; INTRAVENOUS at 06:34

## 2019-01-07 RX ADMIN — DEXAMETHASONE SODIUM PHOSPHATE 1 MG: 4 INJECTION, SOLUTION INTRAMUSCULAR; INTRAVENOUS at 17:24

## 2019-01-07 RX ADMIN — POTASSIUM CHLORIDE SCH MLS/HR: 2 INJECTION, SOLUTION, CONCENTRATE INTRAVENOUS at 18:35

## 2019-01-07 RX ADMIN — DEXAMETHASONE SODIUM PHOSPHATE SCH MG: 4 INJECTION, SOLUTION INTRAMUSCULAR; INTRAVENOUS at 23:33

## 2019-01-07 RX ADMIN — DEXAMETHASONE SODIUM PHOSPHATE SCH MG: 4 INJECTION, SOLUTION INTRAMUSCULAR; INTRAVENOUS at 06:34

## 2019-01-07 RX ADMIN — PANTOPRAZOLE SODIUM SCH MG: 40 INJECTION, POWDER, FOR SOLUTION INTRAVENOUS at 06:34

## 2019-01-07 RX ADMIN — DEXAMETHASONE SODIUM PHOSPHATE SCH MG: 4 INJECTION, SOLUTION INTRAMUSCULAR; INTRAVENOUS at 17:24

## 2019-01-07 RX ADMIN — DEXAMETHASONE SODIUM PHOSPHATE SCH MG: 4 INJECTION, SOLUTION INTRAMUSCULAR; INTRAVENOUS at 11:58

## 2019-01-07 RX ADMIN — LEVETIRACETAM 1 MLS/HR: 5 INJECTION INTRAVENOUS at 09:41

## 2019-01-07 RX ADMIN — DEXAMETHASONE SODIUM PHOSPHATE 1 MG: 4 INJECTION, SOLUTION INTRAMUSCULAR; INTRAVENOUS at 23:33

## 2019-01-07 RX ADMIN — LEVETIRACETAM SCH MLS/HR: 5 INJECTION INTRAVENOUS at 09:41

## 2019-01-07 NOTE — NUR
RN NOTE

RECEIVED CALL FROM MARYLU CURIEL. HE CALLED TO CLARIFY IF PATIENT WILL BE NPO AFTER MIDNIGHT, 
AND ALSO WANTED TO SPEAK TO PATIENT'S DAUGHTER [place] WHO IS IN CHARGE OF MAKING DECISIONS 
FOR PATIENT.  I GAVE HIM THE DAUGHTER IN[place]'S PHONE NUMBER AND HE SAID, HE WILL BE CALLING 
HER SOON.  NO NEW ORDERS GIVEN.

## 2019-01-07 NOTE — NUR
EOSS

PAtient is alert, oriented x4. Respiration even and unlabored. No SOB or desaturation noted. 
At room air. Independent . Ambulatory. Denies pain. REceived an order from Dr. Wilson for NPO 
after midnight for possible surgery in Am. Paged Dr. Bartolo grigsby for Dr. Wilson about what 
kind of surgery is planned tomorrow. Patient doesnt remember doctor saying anything like 
that.

## 2019-01-07 NOTE — NUR
Received a call from Dr. Mcfarland, He said just put the patient for NPO except meds and will 
clarify tomorrow in AM.

## 2019-01-07 NOTE — NUR
PT Evaluation note:



S: HPI per MD note: HISTORY OF PRESENT ILLNESS:  Ms. Santos presents to the emergency room 
at [facility] with alteration of mental status.  Upon further questioning, this 
actually means word finding difficulties and difficulty with speech.  She seems to 
understand questions quite well; however, does not seem to be able to find the right words 
to answer them.  I am not able to obtain additional history at this time.  Calls to family 
have not been answered and patient is unable to participate.



O: MD order received for PT consult. Cleared by RN in charge Juanito to proceed. Patient 
agreeable to participate, no c/o discomfort.



PLOF: Patient independent with ADL and ambulatory without AD, lives with son in apt unit 
first floor without entry steps. no DME owned by patient.



CLOF: Patient still independent with ADL and ambulatory without AD, good balance with good 
safety awareness, follows command, oriented x4. no c/o discomfort during evaluation.



A: NO further therapy needed at this time and upon DC. DC to nursing for general care and 
ambulation without AD needed. 



P: DC PT services.

## 2019-01-07 NOTE — PN
Date/Time of Note


Date/Time of Note


DATE: 1/7/19 


TIME: 17:13





Assessment/Plan


VTE Prophylaxis


Risk score (from [facility])>0 risk:  4


SCD applied (from [facility]):  Yes


Pharmacological prophylaxis:  NA/contraindicated


Pharm contraindication:  surgical contra





Lines/Catheters


IV Catheter Type (from [facility]):  Saline Lock





Assessment/Plan


Hospital Course


58 years f with hx of colon CA PW headache , AMS , and word finding  difficulty 


and found to have a large intracranial mass with metastatic disease of the lungs


and possibly liver.





Plan 


- Dexamethasone , + Seizure PPX 


- PPI for GI PPX 


- Awaiting neurosurgery and oncology input 


- Echo for preoperative assessment


Problems:  


(1) Intracranial mass


Status:  Acute


(2) History of colon cancer


Status:  Chronic


Result Diagram:  


1/7/19 1023                                                                     


          1/7/19 1023





Results 24hrs





Laboratory Tests


               Test
                                1/7/19
10:23


               White Blood Count                        17.6  #H


               Red Blood Count                            4.70


               Hemoglobin                                 13.3


               Hematocrit                                 40.6


               Mean Corpuscular Volume                    86.4


               Mean Corpuscular Hemoglobin               28.3  L


               Mean Corpuscular Hemoglobin
Concent       32.8  



               Red Cell Distribution Width               15.5  H


               Platelet Count                             385  #


               Mean Platelet Volume                      10.6  H


               Immature Granulocytes %                  0.600  H


               Neutrophils %                             88.7  H


               Lymphocytes %                              5.6  L


               Monocytes %                                 5.0


               Eosinophils %                               0.0


               Basophils %                                 0.1


               Nucleated Red Blood Cells %                 0.0


               Immature Granulocytes #                  0.110  H


               Neutrophils #                             15.6  H


               Lymphocytes #                               1.0


               Monocytes #                                 0.9


               Eosinophils #                               0.0


               Basophils #                                 0.0


               Nucleated Red Blood Cells #                 0.0


               Sodium Level                                138


               Potassium Level                             3.9


               Chloride Level                              100


               Carbon Dioxide Level                         27


               Anion Gap                                    11


               Blood Urea Nitrogen                          12


               Creatinine                                 0.66


               Est Glomerular Filtrat Rate
mL/min   > 60  



               Glucose Level                             314  #H


               Calcium Level                               9.2


               Total Bilirubin                            0.1  L


               Direct Bilirubin                           0.00


               Indirect Bilirubin                          0.1


               Aspartate Amino Transf
(AST/SGOT)           37  



               Alanine Aminotransferase
(ALT/SGPT)         34  



               Alkaline Phosphatase                         91


               Total Protein                               8.1


               Albumin                                     4.3


               Globulin                                  3.80  H


               Albumin/Globulin Ratio                     1.13








Subjective


24 Hr Interval Summary


Free Text/Dictation


no headache , no nausea no vomiting , no CP, no SOB





Exam/Review of Systems


Vital Signs


Vitals





Vital Signs


  Date      Temp  Pulse  Resp  B/P (MAP)   Pulse Ox  O2          O2 Flow    FiO2


Time                                                 Delivery    Rate


    1/7/19           55


     17:00


    1/7/19  97.6           18      114/58        99


     15:39                           (76)


    1/7/19                                           Room Air


     04:15








Intake and Output





1/6/19 1/6/19 1/7/19





1515:00


23:00


07:00





IntakeIntake Total


350 ml





BalanceBalance


350 ml














Exam


General: In no acute distress , sitting on the side of the  bed , cooperative , 


pleasant 


HEENT, EOM intact, ASHELY bilat, mucosal membranes moist and pink , no oral les


ions, 


NECK : Supple , not stiffness, no LAP , no mass , no carotid bruit 


Core: S1, S2, NL rate and rhythm, no murmur, no rubs, JVD not elevated, no 


peripheral edema 


Lungs: in no respiratory distress, not using the accessory muscles of 


respiration, clear bilaterally with no rales, no crackles , no wheezing , normal


inspiratory to expiratory ratio 


Abdomen, soft, not distended, normal bowel sounds, no tenderness, 


Extremities without : edema , cyanosis, calf tenderness, brisk capillary refill


Neurological: AOx1-2, , CN2-12 intact, no motor or sensory deficit, normal gait 


and normal coordination 


Psychological: no evidence of depression, anxiety , denies suicidal or homicidal


ideation


Constitutional:  alert, oriented, well developed





Medications


Medications





Current Medications


Levetiracetam 100 ml @  400 mls/hr Q12 IVPB  Last administered on 1/7/19at 


09:41; Admin Dose 400 MLS/HR;  Start 1/5/19 at 21:00


Dexamethasone (Decadron) 4 mg Q6 IV  Last administered on 1/7/19at 11:58; Admin 


Dose 4 MG;  Start 1/5/19 at 18:00


Acetaminophen (Tylenol Tab) 650 mg Q4H  PRN PO MILD PAIN(1-3)OR ELEVATED TEMP 


Last administered on 1/5/19at 18:35; Admin Dose 650 MG;  Start 1/5/19 at 16:00


Ondansetron HCl (Zofran Inj) 4 mg Q4H  PRN IV NAUSEA AND/OR VOMITING;  Start 


1/5/19 at 16:00


Hydralazine HCl (Apresoline) 25 mg Q6H  PRN PO sbp>160;  Start 1/5/19 at 16:00


Morphine Sulfate (morphine SULFATE (PF)) 2 mg Q2H  PRN IV SEVERE PAIN LEVEL 7-


10;  Start 1/5/19 at 16:30


Pantoprazole (Protonix Iv) 40 mg DAILY@06 IV  Last administered on 1/7/19at 


06:34; Admin Dose 40 MG;  Start 1/7/19 at 06:00


Dextrose/Sodium Chloride 1,000 ml @  75 mls/hr [id number] IV ;  Start 1/7/19 at 


17:30











DARNELL NELSON MD                   Jan 7, 2019 17:13

## 2019-01-07 NOTE — NUR
END OF SHIFT RN NOTE

PATIENT ALERT AND ORIENTED X 3, SLEPT WELL LAST NIGHT, AND DENIED PAIN.  AMBULATES WITH 
STANDBY ASSIST.  MEDS GIVEN AS SCHEDULED,  BED IN THE LOWEST POSITION WITH BRAKES ENGAGED,  
HOURLY ROUNDING IN PLACE, CALL LIGHT AND PERSONAL ITEMS WITHIN EASY REACH. WILL ENDORSE TO 
DAY SHIFT RN.

## 2019-01-07 NOTE — CONS
Date/Time of Note


Date/Time of Note


DATE: 1/7/19 


TIME: 20:59





Assessment/Plan


Date of progress note: 1/7/2019





The patient's neurologic status remains unchanged with some expressive and re


ceptive aphasia as before with the patient having difficulty stating the year, 


her location or the day of the week.  She is able to say her first and last name


but not with complete fluency.  There is also mild right hemiparesis as 


indicated by right pronator drift.  She has also received the stereotactic MRI 


of brain with and without contrast at my request that shows a large 


heterogeneously enhancing left frontal parietal temporal mass similar to its 


prior appearance on the prior images.  Part of the tumor comes right to the 


surface of the cortex.  I have spoken in detail with the consulting inpatient 


oncologist, Dr. Nevarez who apparently reviewed some of the patient's history 


yesterday but was unable to physically evaluate the patient yesterday because 


the patient had been downstairs receiving some of the requested imaging studies 


including the CT of the chest abdomen and pelvis that shows "multiple cavitary 


lesions in both lungs consistent with metastatic disease" and "moderate right 


hydroureter nephrosis" and "probable metastatic liver lesions."





Dr. Linares is colleagues with the patient's regular outpatient oncologist and 


she will try to contact her regular oncologist for further assessment of the 


patient's current condition and will get back to the patient, her family and 


myself.  I have indicated to the oncologist that the patient can be a 


neurosurgical candidate in terms of resection of the solitary mass as long as 


the patient's overall prognosis warrants neurosurgical intervention.  Based on 


my overall assessment the patient, it appears that although the patient has had 


metastatic colorectal cancer for more than a year, she overall appears to be in 


good clinical status where she is awake and alert and able to ambulate without 


assistance.  The patient's family tells me that up until the aphasia symptoms 


occurred several weeks ago, the patient was able to do her activities of daily 


living without much difficulty.  The patient has overall been able to eat wi


thout any difficulty.  She has not had any significant weight loss.  She was 


however found to have hydronephrosis recently and plans are being made on an 


outpatient basis to place and stent in the ureter due to what the family 


believes to be from metastatic tumor in the pelvis.





Dr. Quyummi will be visiting the patient again as soon as possible for 


assessment and will get back to us with her evaluation.  I have also relayed 


this information to the patient's hospitalist, Dr. Sewell.


Result Diagram:  


1/7/19 1023                                                                     


          1/7/19 1023





Results 24hrs





Laboratory Tests


               Test
                                1/7/19
10:23


               White Blood Count                        17.6  #H


               Red Blood Count                            4.70


               Hemoglobin                                 13.3


               Hematocrit                                 40.6


               Mean Corpuscular Volume                    86.4


               Mean Corpuscular Hemoglobin               28.3  L


               Mean Corpuscular Hemoglobin
Concent       32.8  



               Red Cell Distribution Width               15.5  H


               Platelet Count                             385  #


               Mean Platelet Volume                      10.6  H


               Immature Granulocytes %                  0.600  H


               Neutrophils %                             88.7  H


               Lymphocytes %                              5.6  L


               Monocytes %                                 5.0


               Eosinophils %                               0.0


               Basophils %                                 0.1


               Nucleated Red Blood Cells %                 0.0


               Immature Granulocytes #                  0.110  H


               Neutrophils #                             15.6  H


               Lymphocytes #                               1.0


               Monocytes #                                 0.9


               Eosinophils #                               0.0


               Basophils #                                 0.0


               Nucleated Red Blood Cells #                 0.0


               Sodium Level                                138


               Potassium Level                             3.9


               Chloride Level                              100


               Carbon Dioxide Level                         27


               Anion Gap                                    11


               Blood Urea Nitrogen                          12


               Creatinine                                 0.66


               Est Glomerular Filtrat Rate
mL/min   > 60  



               Glucose Level                             314  #H


               Calcium Level                               9.2


               Total Bilirubin                            0.1  L


               Direct Bilirubin                           0.00


               Indirect Bilirubin                          0.1


               Aspartate Amino Transf
(AST/SGOT)           37  



               Alanine Aminotransferase
(ALT/SGPT)         34  



               Alkaline Phosphatase                         91


               Total Protein                               8.1


               Albumin                                     4.3


               Globulin                                  3.80  H


               Albumin/Globulin Ratio                     1.13














KELSIE CINTRON MD                Jan 7, 2019 20:59

## 2019-01-08 VITALS — HEART RATE: 67 BPM | RESPIRATION RATE: 15 BRPM | SYSTOLIC BLOOD PRESSURE: 158 MMHG | DIASTOLIC BLOOD PRESSURE: 81 MMHG

## 2019-01-08 VITALS — HEART RATE: 62 BPM

## 2019-01-08 VITALS — SYSTOLIC BLOOD PRESSURE: 121 MMHG | DIASTOLIC BLOOD PRESSURE: 62 MMHG | HEART RATE: 52 BPM | RESPIRATION RATE: 22 BRPM

## 2019-01-08 VITALS — HEART RATE: 54 BPM | DIASTOLIC BLOOD PRESSURE: 64 MMHG | SYSTOLIC BLOOD PRESSURE: 126 MMHG | RESPIRATION RATE: 22 BRPM

## 2019-01-08 VITALS — HEART RATE: 89 BPM

## 2019-01-08 VITALS — SYSTOLIC BLOOD PRESSURE: 121 MMHG | RESPIRATION RATE: 18 BRPM | DIASTOLIC BLOOD PRESSURE: 57 MMHG | HEART RATE: 55 BPM

## 2019-01-08 VITALS — RESPIRATION RATE: 17 BRPM | HEART RATE: 69 BPM | SYSTOLIC BLOOD PRESSURE: 147 MMHG | DIASTOLIC BLOOD PRESSURE: 83 MMHG

## 2019-01-08 VITALS — SYSTOLIC BLOOD PRESSURE: 116 MMHG | DIASTOLIC BLOOD PRESSURE: 60 MMHG | HEART RATE: 61 BPM | RESPIRATION RATE: 18 BRPM

## 2019-01-08 VITALS — SYSTOLIC BLOOD PRESSURE: 154 MMHG | RESPIRATION RATE: 15 BRPM | DIASTOLIC BLOOD PRESSURE: 78 MMHG

## 2019-01-08 VITALS — HEART RATE: 56 BPM

## 2019-01-08 VITALS — DIASTOLIC BLOOD PRESSURE: 79 MMHG | SYSTOLIC BLOOD PRESSURE: 164 MMHG

## 2019-01-08 LAB
ADD MAN DIFF?: NO
ALANINE AMINOTRANSFERASE: 31 IU/L (ref 13–69)
ALBUMIN/GLOBULIN RATIO: 1.12
ALBUMIN: 3.7 G/DL (ref 3.3–4.9)
ALKALINE PHOSPHATASE: 89 IU/L (ref 42–121)
ANION GAP: 8 (ref 5–13)
ASPARTATE AMINO TRANSFERASE: 30 IU/L (ref 15–46)
BASOPHIL #: 0 10^3/UL (ref 0–0.1)
BASOPHILS %: 0.1 % (ref 0–2)
BILIRUBIN,DIRECT: 0 MG/DL (ref 0–0.2)
BILIRUBIN,TOTAL: 0.1 MG/DL (ref 0.2–1.3)
BLOOD UREA NITROGEN: 13 MG/DL (ref 7–20)
CALCIUM: 8.9 MG/DL (ref 8.4–10.2)
CARBON DIOXIDE: 27 MMOL/L (ref 21–31)
CHLORIDE: 105 MMOL/L (ref 97–110)
CREATININE: 0.62 MG/DL (ref 0.44–1)
EOSINOPHILS #: 0 10^3/UL (ref 0–0.5)
EOSINOPHILS %: 0 % (ref 0–7)
GLOBULIN: 3.3 G/DL (ref 1.3–3.2)
GLUCOSE: 232 MG/DL (ref 70–220)
HEMATOCRIT: 39.1 % (ref 37–47)
HEMOGLOBIN: 13 G/DL (ref 12–16)
IMMATURE GRANS #M: 0.13 10^3/UL (ref 0–0.03)
IMMATURE GRANS % (M): 0.7 % (ref 0–0.43)
LYMPHOCYTES #: 1 10^3/UL (ref 0.8–2.9)
LYMPHOCYTES %: 5.9 % (ref 15–51)
MAGNESIUM: 2.4 MG/DL (ref 1.7–2.5)
MEAN CORPUSCULAR HEMOGLOBIN: 28.4 PG (ref 29–33)
MEAN CORPUSCULAR HGB CONC: 33.2 G/DL (ref 32–37)
MEAN CORPUSCULAR VOLUME: 85.4 FL (ref 82–101)
MEAN PLATELET VOLUME: 10.8 FL (ref 7.4–10.4)
MONOCYTE #: 0.8 10^3/UL (ref 0.3–0.9)
MONOCYTES %: 4.5 % (ref 0–11)
NEUTROPHIL #: 15.5 10^3/UL (ref 1.6–7.5)
NEUTROPHILS %: 88.8 % (ref 39–77)
NUCLEATED RED BLOOD CELLS #: 0 10^3/UL (ref 0–0)
NUCLEATED RED BLOOD CELLS%: 0 /100WBC (ref 0–0)
PHOSPHORUS: 3.9 MG/DL (ref 2.5–4.9)
PLATELET COUNT: 353 10^3/UL (ref 140–415)
POTASSIUM: 4.2 MMOL/L (ref 3.5–5.1)
RED BLOOD COUNT: 4.58 10^6/UL (ref 4.2–5.4)
RED CELL DISTRIBUTION WIDTH: 15.3 % (ref 11.5–14.5)
SODIUM: 140 MMOL/L (ref 135–144)
TOTAL PROTEIN: 7 G/DL (ref 6.1–8.1)
WHITE BLOOD COUNT: 17.4 10^3/UL (ref 4.8–10.8)

## 2019-01-08 PROCEDURE — 8E09XBH COMPUTER ASSISTED PROCEDURE OF HEAD AND NECK REGION, WITH MAGNETIC RESONANCE IMAGING: ICD-10-PCS

## 2019-01-08 PROCEDURE — 00B00ZZ EXCISION OF BRAIN, OPEN APPROACH: ICD-10-PCS | Performed by: NEUROLOGICAL SURGERY

## 2019-01-08 PROCEDURE — 8E09XBH COMPUTER ASSISTED PROCEDURE OF HEAD AND NECK REGION, WITH MAGNETIC RESONANCE IMAGING: ICD-10-PCS | Performed by: NEUROLOGICAL SURGERY

## 2019-01-08 PROCEDURE — 00B00ZZ EXCISION OF BRAIN, OPEN APPROACH: ICD-10-PCS

## 2019-01-08 RX ADMIN — LIDOCAINE HYDROCHLORIDE 1 ML: 5 INJECTION, SOLUTION INFILTRATION; PERINEURAL at 19:08

## 2019-01-08 RX ADMIN — GELATIN ABSORBABLE SPONGE SIZE 100 1 SPONGE: MISC at 19:08

## 2019-01-08 RX ADMIN — DEXAMETHASONE SODIUM PHOSPHATE SCH MG: 4 INJECTION, SOLUTION INTRAMUSCULAR; INTRAVENOUS at 05:22

## 2019-01-08 RX ADMIN — DEXAMETHASONE SODIUM PHOSPHATE 1 MG: 4 INJECTION, SOLUTION INTRAMUSCULAR; INTRAVENOUS at 05:22

## 2019-01-08 RX ADMIN — THROMBIN TOPICAL RECOMBINANT 1 UNITS: KIT at 19:07

## 2019-01-08 RX ADMIN — LEVETIRACETAM SCH MLS/HR: 5 INJECTION INTRAVENOUS at 08:50

## 2019-01-08 RX ADMIN — ASCORBIC ACID, VITAMIN A PALMITATE, CHOLECALCIFEROL, THIAMINE HYDROCHLORIDE, RIBOFLAVIN-5 PHOSPHATE SODIUM, PYRIDOXINE HYDROCHLORIDE, NIACINAMIDE, DEXPANTHENOL, ALPHA-TOCOPHEROL ACETATE, VITAMIN K1, FOLIC ACID, BIOTIN, CYANOCOBALAMIN 1 MLS/HR: 200; 3300; 200; 6; 3.6; 6; 40; 15; 10; 150; 600; 60; 5 INJECTION, SOLUTION INTRAVENOUS at 06:50

## 2019-01-08 RX ADMIN — POTASSIUM CHLORIDE SCH MLS/HR: 2 INJECTION, SOLUTION, CONCENTRATE INTRAVENOUS at 13:57

## 2019-01-08 RX ADMIN — DEXAMETHASONE SODIUM PHOSPHATE SCH MG: 4 INJECTION, SOLUTION INTRAMUSCULAR; INTRAVENOUS at 18:00

## 2019-01-08 RX ADMIN — POTASSIUM CHLORIDE SCH MLS/HR: 2 INJECTION, SOLUTION, CONCENTRATE INTRAVENOUS at 06:50

## 2019-01-08 RX ADMIN — DEXAMETHASONE SODIUM PHOSPHATE SCH MG: 4 INJECTION, SOLUTION INTRAMUSCULAR; INTRAVENOUS at 12:44

## 2019-01-08 RX ADMIN — ASCORBIC ACID, VITAMIN A PALMITATE, CHOLECALCIFEROL, THIAMINE HYDROCHLORIDE, RIBOFLAVIN-5 PHOSPHATE SODIUM, PYRIDOXINE HYDROCHLORIDE, NIACINAMIDE, DEXPANTHENOL, ALPHA-TOCOPHEROL ACETATE, VITAMIN K1, FOLIC ACID, BIOTIN, CYANOCOBALAMIN 1 MLS/HR: 200; 3300; 200; 6; 3.6; 6; 40; 15; 10; 150; 600; 60; 5 INJECTION, SOLUTION INTRAVENOUS at 13:57

## 2019-01-08 RX ADMIN — BACITRACIN 1 ML: 50000 INJECTION, POWDER, FOR SOLUTION INTRAMUSCULAR at 19:08

## 2019-01-08 RX ADMIN — DEXAMETHASONE SODIUM PHOSPHATE 1 MG: 4 INJECTION, SOLUTION INTRAMUSCULAR; INTRAVENOUS at 12:44

## 2019-01-08 RX ADMIN — PANTOPRAZOLE SODIUM 1 MG: 40 INJECTION, POWDER, FOR SOLUTION INTRAVENOUS at 05:22

## 2019-01-08 RX ADMIN — LEVETIRACETAM 1 MLS/HR: 5 INJECTION INTRAVENOUS at 08:50

## 2019-01-08 RX ADMIN — BUPIVACAINE HYDROCHLORIDE AND EPINEPHRINE BITARTRATE 1 ML: 5; .005 INJECTION, SOLUTION EPIDURAL; INTRACAUDAL; PERINEURAL at 19:07

## 2019-01-08 RX ADMIN — PANTOPRAZOLE SODIUM SCH MG: 40 INJECTION, POWDER, FOR SOLUTION INTRAVENOUS at 05:22

## 2019-01-08 RX ADMIN — DEXAMETHASONE SODIUM PHOSPHATE 1 MG: 4 INJECTION, SOLUTION INTRAMUSCULAR; INTRAVENOUS at 18:00

## 2019-01-08 NOTE — SIPON
Date/Time of Note


Date/Time of Note


DATE: 1/8/19 


TIME: 23:07





Operative Report


Preoperative Diagnosis


Left parietal intraparenchymal tumor


Postoperative Diagnosis


same as above


Operation/Procedure Performed


Left parital craniotomy for resection of tumor, stereotactic navigation


Surgeon


see signature line


First assist


None


Anesthesia:  general


Estimated blood loss:  100 - 150 ml's (150cc)


Transfusion Required


   none


Specimen


left parietal tumor


Grafts/Implants


none


Complications


none











KELSIE CINTRON MD                Jan 8, 2019 23:09

## 2019-01-08 NOTE — NUR
DR. CINTRON NOTIFIED ABOUT BP, MD SAID TO KEEP SBP <180. MD ORDER CT SCAN STAT AND OK TO DO 
MRI TOMORROW AM. TO CARRY OUT.

## 2019-01-08 NOTE — PAC
Date/Time of Note


Date/Time of Note


DATE: 1/8/19 


TIME: 23:06





Post-Anesthesia Notes


Post-Anesthesia Note


Last documented vital signs





Vital Signs


  Date      Temp  Pulse  Resp  B/P (MAP)   Pulse Ox  O2          O2 Flow    FiO2


Time                                                 Delivery    Rate


    1/8/19  98.0


     23:03


    1/8/19           56


     12:23


    1/8/19                 22      121/62        96  Room Air


     11:30                           (81)





Activity:  WNL


Respiratory function:  WNL


Cardiovascular function:  WNL


Mental status:  Baseline


Pain reasonably controlled:  Yes


Hydration appropriate:  Yes


Nausea/Vomiting absent:  Yes


Comments


BP:134/67, pulse:78, spo2:100%, T:98,8











TABBY ALEXANDRE MD               Jan 8, 2019 23:06

## 2019-01-08 NOTE — NUR
END OF SHIFT RN NOTE

PATIENT ALERT AND ORIENTED X 3 - 4, SLEPT WELL LAST NIGHT, AND DENIED PAIN.  AMBULATES WITH  
STEADY GAIT.  PATIENT KEPT NPO FOR POSSIBLE CRANIOTOMY TODAY AT 1230 AS PER MARYLU CURIEL.   
PATIENT AND FAMILY AWARE OF SURGERY.  FAMILY AT BED SIDE ALL THE TIME. MEDS GIVEN AS 
SCHEDULED,  BED IN THE LOWEST POSITION WITH BRAKES ENGAGED,  HOURLY ROUNDING IN PLACE, CALL 
LIGHT AND PERSONAL ITEMS WITHIN EASY REACH. WILL ENDORSE TO DAY SHIFT RN.

## 2019-01-08 NOTE — PREAC
Date/Time of Note


Date/Time of Note


DATE: 1/8/19 


TIME: 16:22





Anesthesia Eval and Record


Evaluation


Time Pre-Procedure Interview


DATE: 1/8/19 


TIME: 16:22


Age


58


Sex


female


NPO:  8 hrs


Preoperative diagnosis


Lt intraparenchial tumor


Planned procedure


Lt occipital tumor resection





Past Medical History


Past Medical History:  None





Surgery & Anesthesia Issues


No known issue





Meds


Anticoagulation:  No


Beta Blocker within 24 hr:  No


Reason Beta Blocker not given:  Pt. not on B-Blocker


Discontinued Reported Medications


Multivitamins* (Theragran*) 1 Tab Tab, 1 TAB PO DAILY, TAB


   12/4/18


Regorafenib (STIVARGA) 40 Mg Tablet, 40 MG PO, TAB


   12/4/18


Discontinued Scripts


Hydrocodone/Acetaminophen (Norco 5-325 Tablet) 1 Each Tablet, 1 TAB PO Q6H PRN 


for PAIN, #7 TAB


   Prov:TAB SALGADO MD         12/4/18





Current Medications


Levetiracetam 100 ml @  400 mls/hr Q12 IVPB  Last administered on 1/8/19at 


08:50; Admin Dose 400 MLS/HR;  Start 1/5/19 at 21:00


Dexamethasone (Decadron) 4 mg Q6 IV  Last administered on 1/8/19at 12:44; Admin 


Dose 4 MG;  Start 1/5/19 at 18:00


Acetaminophen (Tylenol Tab) 650 mg Q4H  PRN PO MILD PAIN(1-3)OR ELEVATED TEMP 


Last administered on 1/5/19at 18:35; Admin Dose 650 MG;  Start 1/5/19 at 16:00


Ondansetron HCl (Zofran Inj) 4 mg Q4H  PRN IV NAUSEA AND/OR VOMITING;  Start 


1/5/19 at 16:00


Hydralazine HCl (Apresoline) 25 mg Q6H  PRN PO sbp>160;  Start 1/5/19 at 16:00


Morphine Sulfate (morphine SULFATE (PF)) 2 mg Q2H  PRN IV SEVERE PAIN LEVEL 7-


10;  Start 1/5/19 at 16:30


Pantoprazole (Protonix Iv) 40 mg DAILY@06 IV  Last administered on 1/8/19at 


05:22; Admin Dose 40 MG;  Start 1/7/19 at 06:00


Dextrose/Sodium Chloride 1,000 ml @  75 mls/hr [id number] IV  Last administered on 


1/8/19at 13:57; Admin Dose 75 MLS/HR;  Start 1/7/19 at 17:30


Meds reviewed:  Yes





Allergies


Coded Allergies:  


     No Known Allergy (Unverified , 1/5/19)


Allergies Reviewed:  Yes





Labs/Studies


Labs Reviewed:  Reviewed by anesthesiologist


Result Diagram:  


1/8/19 0523                                                                     


          1/8/19 0523





Laboratory Tests


1/8/19 05:23








Pregnancy test:  N/A


Studies:  ECG





Pre-procedure Exam


Last vitals





Vital Signs


  Date      Temp  Pulse  Resp  B/P (MAP)   Pulse Ox  O2          O2 Flow    FiO2


Time                                                 Delivery    Rate


    1/8/19           56


     12:23


    1/8/19  97.9           22      121/62        96  Room Air


     11:30                           (81)





Airway:  Adequate mouth opening, Adequate thyromental dist


Mallampati:  Mallampati II


Teeth:  Normal


Lung:  Normal


Heart:  Normal





ASA Physical Status


ASA physical status:  3


Emergency:  None





Planned Anesthetic


General/MAC:  ETT





Planned Pain Management


Parenteral pain med





Pre-operative Attestations


Prior to commencing anesthesia and surgery, the patient was re-evaluated, there 


was verification of:


*The patient's identity


*The results of appropriate recent lab work and preoperative vital signs


*The above evaluation not changing prior to induction


*Anesthetic plan, risk benefits, alternative and complications discussed with 


patient/family; questions answered; patient/family understands, accepts and 


wishes to proceed.











TABBY ALEXANDRE MD               Jan 8, 2019 16:24

## 2019-01-08 NOTE — PN
Date/Time of Note


Date/Time of Note


DATE: 1/8/19 


TIME: 12:46





Assessment/Plan


VTE Prophylaxis


Risk score (from Ns)>0 risk:  3


SCD applied (from Nsg):  Yes


Pharmacological prophylaxis:  other





Lines/Catheters


IV Catheter Type (from Nrs):  Saline Lock





Assessment/Plan


Assessment/Plan


58 year old female with metastatic colorectal cancer with multiple lung and 


liver metastasis, who presented with altered mental status and word finding 


difficulties.  As an outpatient ,she is followed by Dr. Eula Yip and recently 


was transitioned from Stivarga to Lonsurf, as fourth line treatment, due to 


recent imaging showing progression of disease.  She had imaging on admission and


MRI brain showed a 3.5 cm x 3.3 cm T2 inhomogeneous hyopintensity/T1 


inhomogeneous hyperintensity mass, likely hemorrhagic metastatic, with large 


surrounding vasogenic edema in the left parietotemporal lobe, associated with 


0.5 cm midline shift toward the right. She was started on steroids and keppra 


for seizure prophylaxis.  If neurosurgery feels the mass can be safely resected,


I believe this would be a good option for her as she was symptomatic from the 


tumor/mass effect.  It is difficult to predict her prognosis from metastatic col


on cancer, but her morbidity would likely be worse without neurosurgical 


intervention. 





Thank you for allowing me to participate in the care for this patient. Please 


call with further questions or concerns, [phone].


Result Diagram:  


1/8/19 0523 1/8/19 0523





Results 24hrs





Laboratory Tests


               Test
                                1/8/19
05:23


               White Blood Count                         17.4  H


               Red Blood Count                            4.58


               Hemoglobin                                 13.0


               Hematocrit                                 39.1


               Mean Corpuscular Volume                    85.4


               Mean Corpuscular Hemoglobin               28.4  L


               Mean Corpuscular Hemoglobin
Concent       33.2  



               Red Cell Distribution Width               15.3  H


               Platelet Count                              353


               Mean Platelet Volume                      10.8  H


               Immature Granulocytes %                  0.700  H


               Neutrophils %                             88.8  H


               Lymphocytes %                              5.9  L


               Monocytes %                                 4.5


               Eosinophils %                               0.0


               Basophils %                                 0.1


               Nucleated Red Blood Cells %                 0.0


               Immature Granulocytes #                  0.130  H


               Neutrophils #                             15.5  H


               Lymphocytes #                               1.0


               Monocytes #                                 0.8


               Eosinophils #                               0.0


               Basophils #                                 0.0


               Nucleated Red Blood Cells #                 0.0


               Sodium Level                                140


               Potassium Level                             4.2


               Chloride Level                              105


               Carbon Dioxide Level                         27


               Anion Gap                                     8


               Blood Urea Nitrogen                          13


               Creatinine                                 0.62


               Est Glomerular Filtrat Rate
mL/min   > 60  



               Glucose Level                              232  H


               Calcium Level                               8.9


               Phosphorus Level                            3.9


               Magnesium Level                             2.4


               Total Bilirubin                            0.1  L


               Direct Bilirubin                           0.00


               Indirect Bilirubin                          0.1


               Aspartate Amino Transf
(AST/SGOT)           30  



               Alanine Aminotransferase
(ALT/SGPT)         31  



               Alkaline Phosphatase                         89


               Total Protein                              7.0  #


               Albumin                                     3.7


               Globulin                                  3.30  H


               Albumin/Globulin Ratio                     1.12








Subjective


24 Hr Interval Summary


Free Text/Dictation


HEMATOLOGY ONCOLOGY PROGRESS NOTE





Patient is feeling well, headaches improved.


Constitutional:  No no complaints, No improved, No chills, No diaphoresis, No 


disoriented, No febrile, No poor po, No requiring IVF, No requiring O2, No other


Eyes:  No no complaints, No pain, No discharge, No redness, No visual change, No


other


ENT:  No no complaints, No bleeding, No pain, No congestion, No discharge, No 


dysphagia, No sore throat, No other


Respiratory:  No no complaints, No pain, No cough, No pleuritic pain, No s


hortness of breath, No sputum, No wheezing, No other


Cardiovascular:  No no complaints, No chest pain, No edema, No lightheadedness, 


No orthopenea, No palpitations, No paroxysmal nocturnal dyspnea, No other


Gastrointestinal:  No no complaints, No pain, No blood, No constipation, No 


decreased appetite, No diarrhea, No flatus, No nausea, No passing stool, No 


vomiting, No other


Genitourinary:  No no complaints, No bleeding, No dysuria, No discharge, No 


flank pain, No hematuria, No other


Musculoskeletal:  No no complaints, No back pain, No bone/joint pain, No neck 


pain, No restricted range of motion, No swelling, No other


Skin:  No no complaints, No bruising, No erythema, No laceration, No pruritis, 


No rash, No skin lesions, No other


Neurologic:  headache; 


   No no complaints, No confusion, No dizziness, No focal-weakness, No syncope, 


No seizure, No other


Endocrine:  No no complaints, No polyuria, No polydypsia, No dry skin, No temp 


intolerance, No other


Lymphatic:  No no complaints, No adenopathy, No tender nodes, No lymphadema, No 


other


Psychological:  No no complaints, No nl mood/affect, No anxiety, No confusion, 


No depression, No suicidal, No other


Immunologic:  No no complaints, No immunodeficiency, No pruritis, No rhinitis, 


No urticaria, No other





Exam/Review of Systems


Vital Signs


Vitals





Vital Signs


  Date      Temp  Pulse  Resp  B/P (MAP)   Pulse Ox  O2          O2 Flow    FiO2


Time                                                 Delivery    Rate


    1/8/19           56


     12:23


    1/8/19  97.9           22      121/62        96  Room Air


     11:30                           (81)








Intake and Output





1/7/19 1/7/19 1/8/19





1515:00


23:00


07:00





IntakeIntake Total


100 ml


840 ml


400 ml





BalanceBalance


100 ml


840 ml


400 ml














Exam


Constitutional:  alert, oriented, well developed


Psych:  no complaints, nl mood/affect


Head:  normocephalic, atraumatic


Eyes:  nl conjunctiva, nl sclera, PERRL


ENMT:  nl external ears & nose, mucosa pink and moist


Neck:  supple


Respiratory:  clear to auscultation, normal air movement, wheezing


Cardiovascular:  regular rate and rhythm


Gastrointestinal:  soft, nl liver, spleen, non-tender (+ostomy bag)


Musculoskeletal:  nl extremities to inspection, nl gait and stance


Extremities:  No normal pulses, No calf tenderness, No cyanosis, No clubbing, No


edema, No pitting pedal edema, No palpable cord, No tenderness, No other


Neurological:  CNS II-XII intact





Medications


Medications





Current Medications


Levetiracetam 100 ml @  400 mls/hr Q12 IVPB  Last administered on 1/8/19at 


08:50; Admin Dose 400 MLS/HR;  Start 1/5/19 at 21:00


Dexamethasone (Decadron) 4 mg Q6 IV  Last administered on 1/8/19at 12:44; Admin 


Dose 4 MG;  Start 1/5/19 at 18:00


Acetaminophen (Tylenol Tab) 650 mg Q4H  PRN PO MILD PAIN(1-3)OR ELEVATED TEMP 


Last administered on 1/5/19at 18:35; Admin Dose 650 MG;  Start 1/5/19 at 16:00


Ondansetron HCl (Zofran Inj) 4 mg Q4H  PRN IV NAUSEA AND/OR VOMITING;  Start 


1/5/19 at 16:00


Hydralazine HCl (Apresoline) 25 mg Q6H  PRN PO sbp>160;  Start 1/5/19 at 16:00


Morphine Sulfate (morphine SULFATE (PF)) 2 mg Q2H  PRN IV SEVERE PAIN LEVEL 7-


10;  Start 1/5/19 at 16:30


Pantoprazole (Protonix Iv) 40 mg DAILY@06 IV  Last administered on 1/8/19at 0


5:22; Admin Dose 40 MG;  Start 1/7/19 at 06:00


Dextrose/Sodium Chloride 1,000 ml @  75 mls/hr [id number] IV  Last administered on 


1/7/19at 18:35; Admin Dose 75 MLS/HR;  Start 1/7/19 at 17:30











GABRIELLA HERNANDEZ MD               Jan 8, 2019 12:53

## 2019-01-08 NOTE — CONS
Date/Time of Note


Date/Time of Note


DATE: 1/8/19 


TIME: 1400





Assessment/Plan


Assessment/Plan


Hospital Course


Date of progress note: 1/8/2019





The patient has been evaluated by oncology, Dr. Linares who has indicated that 


the patient can benefit from neurosurgical intervention i.e. resection versus 


debulking/biopsy of the intracranial tumor.  The nature and the goal of the 


above operation as has been explained by myself and oncologist to the patient 


and her family is palliative in nature and not curative in nature.  At this 


point, it is not clear whether the mass is metastatic in nature versus a primary


brain tumor but once the mass is sent to pathology we will have a better idea as


to the nature of the tumor.





The patient's neurologic status remains unchanged with continued some receptive 


and expressive aphasia as before.  The patient has a mild right hemiparesis.  I 


have discussed the risks and benefits of a left frontoparieto occipital 


stereotactic craniotomy for resection versus biopsy/debulking of the 


intracranial tumor with the patient and her family in detail multiple separate 


times.  The patient's son and his wife (the patient's daughter-in-law) fully 


understand the risks of the above surgery includin bleeding, infection, 


weakness, numbness, paralysis, blindness, further difficulty with speech, 


comatose state, cerebrospinal fluid leak, failure of improvement of symptoms or 


worsening of symptoms, bowel or bladder dysfunction, need for further surgeries 


including redo craniotomy and resection of tumor as well as need for CSF 


diversion i.e.  shunt placement as well as those risks associated with surgery


and general anesthesia including deep venous thrombosis, pulmonary embolism, 


pneumonia, heart attack, stroke and death.  The goal of the surgery is not 


curative in nature but rather palliative in nature.  Depending on the type of 


tumor that this mass turns out to be, the patient will also likely require 


adjuvant chemotherapy and/or radiation therapy will be further determined by o


ncology and radiation oncology.  The patient and her family fully understand the


above discussion and wish to proceed with the above surgery.  We will try to 


accommodate him as best as we can.


Result Diagram:  


1/8/19 0523                                                                     


          1/8/19 0523





Results 24hrs





Laboratory Tests


               Test
                                1/8/19
05:23


               White Blood Count                         17.4  H


               Red Blood Count                            4.58


               Hemoglobin                                 13.0


               Hematocrit                                 39.1


               Mean Corpuscular Volume                    85.4


               Mean Corpuscular Hemoglobin               28.4  L


               Mean Corpuscular Hemoglobin
Concent       33.2  



               Red Cell Distribution Width               15.3  H


               Platelet Count                              353


               Mean Platelet Volume                      10.8  H


               Immature Granulocytes %                  0.700  H


               Neutrophils %                             88.8  H


               Lymphocytes %                              5.9  L


               Monocytes %                                 4.5


               Eosinophils %                               0.0


               Basophils %                                 0.1


               Nucleated Red Blood Cells %                 0.0


               Immature Granulocytes #                  0.130  H


               Neutrophils #                             15.5  H


               Lymphocytes #                               1.0


               Monocytes #                                 0.8


               Eosinophils #                               0.0


               Basophils #                                 0.0


               Nucleated Red Blood Cells #                 0.0


               Sodium Level                                140


               Potassium Level                             4.2


               Chloride Level                              105


               Carbon Dioxide Level                         27


               Anion Gap                                     8


               Blood Urea Nitrogen                          13


               Creatinine                                 0.62


               Est Glomerular Filtrat Rate
mL/min   > 60  



               Glucose Level                              232  H


               Calcium Level                               8.9


               Phosphorus Level                            3.9


               Magnesium Level                             2.4


               Total Bilirubin                            0.1  L


               Direct Bilirubin                           0.00


               Indirect Bilirubin                          0.1


               Aspartate Amino Transf
(AST/SGOT)           30  



               Alanine Aminotransferase
(ALT/SGPT)         31  



               Alkaline Phosphatase                         89


               Total Protein                              7.0  #


               Albumin                                     3.7


               Globulin                                  3.30  H


               Albumin/Globulin Ratio                     1.12














KELSIE CINTRON MD                Jan 8, 2019 16:00

## 2019-01-08 NOTE — RADRPT
Echocardiogram Report

 

Patient Name:  NANCY PASTRANA   Gender:       Female

MRN:           [id number]           Accession #:  [id number]

Birth Date:    1960      Study Date:   08-Jan-2019

Sonographer:   [facility]  Location:     608-A

 

Ref. Physician: DARNELL NELSON

Quality: Technically Difficult Study

 

Procedures: Transthoracic echocardiogram with complete 2D, M-Mode, and 

doppler examination.

Indications: Pre-op.

 

2D/M Mode                          Doppler

Measurement  Value  Normal Ranges  Measurement    Value  Normal Ranges

LVIDd 2D     3.8    3.5 - 5.6 cm   AV Peak Dominick    1.1    m/sec

LVIDs 2D     2.5    2.1 - 4.1 cm   AV Peak PG     5.0    mmHg

FS 2D        34.4   %              LVOT Peak Dominick  1.0    m/sec

LVPWd 2D     1.0    0.6 - 1.1 cm   LVOT Peak PG   4.0    mmHg

IVSd 2D      0.9    0.6 - 1.1 cm   MV E Peak Dominick  0.9    m/sec

IVS/LVPW 2D  0.9                   MV A Peak Dominick  0.6    m/sec

AoR Diam 2D  2.7    2.0 - 3.7 cm   MV E/A         1.4

LA/Ao 2D     1      0 - 1          MV Decel Time  183    msec

EDV 2D       56.6   cm3            MV E/A         1.4

ESV 2D       16.0   cm3            TR Peak Dominick    2.6    m/sec

LA Dimen 2D  2.8    2.3 - 4.0 cm   TR Peak PG     26.0   mmHg

RVSP           29.0   mmHg

 

Findings

Left Ventricle: Overall, normal left ventricular systolic function. Not 

all segments visualized.  Normal left ventricular cavity size.  Normal 

left ventricular wall thickness.  Ejection fraction is visually 

estimated at 55 %.

Right Ventricle: Normal right ventricular size.  Normal right 

ventricular systolic function.

Left Atrium: The left atrium is normal in size.

Right Atrium: The right atrium is normal in size.

Mitral Valve: Mild mitral leaflet calcification.  Mild mitral annular 

calcification.  Trace mitral regurgitation.

Aortic Valve: No significant aortic stenosis or insufficiency.  Aortic 

cusps appear mildly calcified.

Tricuspid Valve: Normal appearance and function of the tricuspid valve 

with trace physiologic regurgitation.  Estimated peak PA systolic 

pressure 29 mmHg.

Pericardium: Normal pericardium with no significant pericardial effusion.

Aorta: Normal aortic root.

IVC: Normal size and normal respiratory collapse consistent with normal 

right atrial pressure.

 

Conclusions

Overall, normal left ventricular systolic function. Not all segments 

visualized.  Normal left ventricular cavity size.  Normal left 

ventricular wall thickness.  Ejection fraction is visually estimated at 

55 %.

Normal right ventricular size.  Normal right ventricular systolic 

function.

The left atrium is normal in size.

The right atrium is normal in size.

No significant valvular stenosis or regurgitation seen.

Normal pericardium with no significant pericardial effusion.

 

Electronically Signed By:

Alex Monte

08-Jan-2019 13:53:27  -0800

 

Patient Name: NANCY PASTRANA

MRN: [id number]

Study Date: 08-Jan-2019

 

[id number]

## 2019-01-08 NOTE — NUR
Per charge nurse, patient will go to ICU after the surgery. Belongings will be packed and be 
given to the ICU

## 2019-01-08 NOTE — OPR
Date/Time of Note


Date/Time of Note


DATE: 1/8/19 


TIME: 23:09





Operative Report


Procedure Date:  Jan 8, 2019


Preoperative Diagnosis


Please see below.


Postoperative Diagnosis


Please see below.


Operation/Procedure Performed


Please see below.


Surgeon


see signature line


Assistant


None


Anesthesia Type:  general


Estimated Blood Loss:  100 - 150 ml's (150cc)


Transfusion


   none


Specimen


Please see below.


Grafts/Implants


Please see below.


Tubes/Drains


None


Complications


none


Pt Condition Post Procedure:  stable


Disposition:  other (ICU)


Procedure Description


Date of operation: 1/8/2019





Operating surgeon: Kelsie Cintron M.D.





Preoperative diagnosis: Left frontoparietal intraparenchymal tumor





Post operative diagnosis: Left frontoparietal intraparenchymal tumor





Procedure(s) performed: 


1.  Left parietal craniotomy for resection of interparenchymal tumor


2.  Image guided frameless stereotactic neuro navigation (intradural, [employer] 


neuro navigation)


3.  Intraoperative microscope with microdissection





Indication for procedure: Please look at the inpatient consultation notes for 


full set of indications.





Description of operative procedure: The patient was brought to the operating 


room and placed supine on the operating table.  After general anesthesia was 


obtained, the patient's head was placed in the Haddad head ocampo.  A roll was


placed underneath her left hemithorax to allow the head and neck to be rotated 


towards the right exposing the left sandi-scalp.  Her head and neck was locked to


the table with her left sandi-scalp parallel to the floor.  The patient's head 


and facial fiducials were then registered with her preoperatively obtained stere


otactic MRI using the [employer] neuro navigation.  Multiple external landmarks 


were then checked for accuracy of the registration.  The borders of the tumor 


were located using the stereotactic neuro navigation over the patient's left 


sandi-scalp.  The focal area of the tumor that came right to the surface of the 


cortex by the left parietal area was also located.  Then a curvilinear f


rontoparietal incision was marked.  No hair was shaven and instead the patient's


hair was then parted away along the marked incision line.  After the patient's 


scalp was prepped under standard sterile fashion, the hair was then stapled 


along the marked incision line allowing visualization of the incision.  The head


and scalp was then draped under standard sterile fashion.  Local anesthetics 


were infiltrated into the marked incision.  The skin was then incised down to 


the level of the skull.  A myocutaneous flap was then rotated inferiorly and 


retracted away with fishhooks.  Using stereotactic navigation, the focal area 


where the tumor came close to the cortex was again located.  A small craniotomy 


going just beyond the borders of this area was then marked.  A Bur hole was made


down to the level of the dura.  The dura was then  from the overlying 


skull.  A craniotome was used to complete the small craniotomy.  The patient had


a radial been given Decadron and mannitol.  He was hyperventilated to a PCO2 of 


28.  The operating microscope was brought into the field.  A small dural opening


was made right over the area where the tumor came close to the surface of the 


cortex to prevent the underlying edematous brain from herniating outward.  The 


dura was opened in a cruciate fashion and the dural leaflets were retracted with


Nurolon sutures.  A small corticectomy was made.  Discolored grayish tumor 


tissue was seen right underneath the very thin layer of cortex.  The surrounding


brain was very edematous.  Initially, the core of the tumor was debulked and the


[employer] ultrasonic aspirating device was used to aid with this.  Several pieces of 


the tumor were removed and sent off the table for frozen pathology.  As the core


of the tumor was further dissected, the surrounding edematous brain became more 


relaxed.  We were then able to systematically extend our dissection towards the 


various borders of the tumor.  There were dissection planes noted at the border 


of the tumor.  There were parts of the tumor that were very vascular and fed by 


multiple arteries.  Several en passage vessels feeding normal surrounding brain 


tissue were encountered and were not coagulated.  Stereotactic navigation was 


also used to confirm that all the borders of the tumor were reached.  Normal 


brain tissue was encountered at all the boundaries.  Gross total resection was 


achieved.  The remainder part of the tumor tissue removed was sent for permanent


pathology.  Complete hemostasis was obtained.  By the end of the procedure the 


surrounding brain was completely relaxed and decompressed.  The large tumor 


cavity was then irrigated with antibiotic solution.  The walls of the tumor 


resection cavity were then lined up with Surgicel.  The resection cavity was 


then packed with Gelfoam with thrombin.  The dura was then reapproximated with 


interrupted sutures.  The bone flap was then reattached to the skull with 


titanium plates and screws.  The scalp was then reapproximated at the level of 


the galea with interrupted sutures.  The skin was reapproximated with staples.  


A thin film of Neosporin ointment was placed over the incision line.  The 


patient's head was then removed from the Haddad head ocampo.  She was then 


woken up, extubated and transferred to the ICU in stable condition.  In the ICU 


the patient was awake and moving her upper and lower extremities equally and she


was able to talk and following basic commands.





Estimated blood loss: 150 cc





Blood products administered: None





Packs/drains: None





Type of anesthesia: General





Incision: Left frontoparietal curvilinear





Skin closure: Staples





Wound classification: Clean





Specimen removed: Left frontoparietal tumor (frozen pathology: Probable 


metastatic)





Patient's condition: Stable





Prognosis: Guarded











KELSIE CINTRON MD                Jan 8, 2019 23:09

## 2019-01-09 VITALS — HEART RATE: 59 BPM

## 2019-01-09 VITALS — DIASTOLIC BLOOD PRESSURE: 71 MMHG | HEART RATE: 62 BPM | RESPIRATION RATE: 15 BRPM | SYSTOLIC BLOOD PRESSURE: 135 MMHG

## 2019-01-09 VITALS — RESPIRATION RATE: 14 BRPM | DIASTOLIC BLOOD PRESSURE: 79 MMHG | HEART RATE: 71 BPM | SYSTOLIC BLOOD PRESSURE: 158 MMHG

## 2019-01-09 VITALS — HEART RATE: 74 BPM | DIASTOLIC BLOOD PRESSURE: 73 MMHG | SYSTOLIC BLOOD PRESSURE: 143 MMHG | RESPIRATION RATE: 14 BRPM

## 2019-01-09 VITALS — RESPIRATION RATE: 15 BRPM | HEART RATE: 76 BPM | SYSTOLIC BLOOD PRESSURE: 131 MMHG | DIASTOLIC BLOOD PRESSURE: 76 MMHG

## 2019-01-09 VITALS — RESPIRATION RATE: 14 BRPM | HEART RATE: 60 BPM | SYSTOLIC BLOOD PRESSURE: 161 MMHG | DIASTOLIC BLOOD PRESSURE: 75 MMHG

## 2019-01-09 VITALS — SYSTOLIC BLOOD PRESSURE: 161 MMHG | DIASTOLIC BLOOD PRESSURE: 114 MMHG

## 2019-01-09 VITALS — RESPIRATION RATE: 17 BRPM | HEART RATE: 65 BPM | DIASTOLIC BLOOD PRESSURE: 69 MMHG | SYSTOLIC BLOOD PRESSURE: 135 MMHG

## 2019-01-09 VITALS — DIASTOLIC BLOOD PRESSURE: 73 MMHG | HEART RATE: 62 BPM | RESPIRATION RATE: 14 BRPM | SYSTOLIC BLOOD PRESSURE: 114 MMHG

## 2019-01-09 VITALS — RESPIRATION RATE: 17 BRPM | SYSTOLIC BLOOD PRESSURE: 136 MMHG | DIASTOLIC BLOOD PRESSURE: 76 MMHG | HEART RATE: 76 BPM

## 2019-01-09 VITALS — RESPIRATION RATE: 15 BRPM | DIASTOLIC BLOOD PRESSURE: 76 MMHG | SYSTOLIC BLOOD PRESSURE: 151 MMHG | HEART RATE: 65 BPM

## 2019-01-09 VITALS — HEART RATE: 65 BPM | SYSTOLIC BLOOD PRESSURE: 138 MMHG | DIASTOLIC BLOOD PRESSURE: 76 MMHG | RESPIRATION RATE: 16 BRPM

## 2019-01-09 VITALS — SYSTOLIC BLOOD PRESSURE: 121 MMHG | DIASTOLIC BLOOD PRESSURE: 66 MMHG | RESPIRATION RATE: 10 BRPM | HEART RATE: 69 BPM

## 2019-01-09 VITALS — DIASTOLIC BLOOD PRESSURE: 73 MMHG | SYSTOLIC BLOOD PRESSURE: 133 MMHG | RESPIRATION RATE: 16 BRPM | HEART RATE: 70 BPM

## 2019-01-09 VITALS — HEART RATE: 61 BPM | DIASTOLIC BLOOD PRESSURE: 77 MMHG | SYSTOLIC BLOOD PRESSURE: 140 MMHG | RESPIRATION RATE: 14 BRPM

## 2019-01-09 VITALS — DIASTOLIC BLOOD PRESSURE: 78 MMHG | SYSTOLIC BLOOD PRESSURE: 144 MMHG | HEART RATE: 71 BPM | RESPIRATION RATE: 22 BRPM

## 2019-01-09 VITALS — DIASTOLIC BLOOD PRESSURE: 66 MMHG | RESPIRATION RATE: 14 BRPM | SYSTOLIC BLOOD PRESSURE: 123 MMHG | HEART RATE: 56 BPM

## 2019-01-09 VITALS — HEART RATE: 63 BPM | RESPIRATION RATE: 16 BRPM | SYSTOLIC BLOOD PRESSURE: 142 MMHG | DIASTOLIC BLOOD PRESSURE: 69 MMHG

## 2019-01-09 VITALS — SYSTOLIC BLOOD PRESSURE: 136 MMHG | HEART RATE: 62 BPM | DIASTOLIC BLOOD PRESSURE: 69 MMHG | RESPIRATION RATE: 15 BRPM

## 2019-01-09 VITALS — SYSTOLIC BLOOD PRESSURE: 145 MMHG | HEART RATE: 74 BPM | RESPIRATION RATE: 15 BRPM | DIASTOLIC BLOOD PRESSURE: 81 MMHG

## 2019-01-09 VITALS — HEART RATE: 67 BPM | RESPIRATION RATE: 17 BRPM | SYSTOLIC BLOOD PRESSURE: 128 MMHG | DIASTOLIC BLOOD PRESSURE: 62 MMHG

## 2019-01-09 VITALS — HEART RATE: 79 BPM | DIASTOLIC BLOOD PRESSURE: 68 MMHG | RESPIRATION RATE: 12 BRPM | SYSTOLIC BLOOD PRESSURE: 135 MMHG

## 2019-01-09 VITALS — SYSTOLIC BLOOD PRESSURE: 134 MMHG | RESPIRATION RATE: 16 BRPM | HEART RATE: 62 BPM | DIASTOLIC BLOOD PRESSURE: 111 MMHG

## 2019-01-09 VITALS — HEART RATE: 65 BPM | RESPIRATION RATE: 18 BRPM | DIASTOLIC BLOOD PRESSURE: 73 MMHG | SYSTOLIC BLOOD PRESSURE: 139 MMHG

## 2019-01-09 VITALS — RESPIRATION RATE: 21 BRPM | SYSTOLIC BLOOD PRESSURE: 101 MMHG | DIASTOLIC BLOOD PRESSURE: 85 MMHG | HEART RATE: 69 BPM

## 2019-01-09 VITALS — RESPIRATION RATE: 13 BRPM | SYSTOLIC BLOOD PRESSURE: 146 MMHG | DIASTOLIC BLOOD PRESSURE: 77 MMHG | HEART RATE: 80 BPM

## 2019-01-09 VITALS — SYSTOLIC BLOOD PRESSURE: 123 MMHG | RESPIRATION RATE: 15 BRPM | HEART RATE: 66 BPM | DIASTOLIC BLOOD PRESSURE: 61 MMHG

## 2019-01-09 VITALS — DIASTOLIC BLOOD PRESSURE: 67 MMHG | RESPIRATION RATE: 12 BRPM | HEART RATE: 57 BPM | SYSTOLIC BLOOD PRESSURE: 103 MMHG

## 2019-01-09 VITALS — HEART RATE: 76 BPM | RESPIRATION RATE: 17 BRPM | SYSTOLIC BLOOD PRESSURE: 131 MMHG | DIASTOLIC BLOOD PRESSURE: 79 MMHG

## 2019-01-09 LAB
ABNORMAL IP MESSAGE: 1
ADD MAN DIFF?: NO
ANION GAP: 10 (ref 5–13)
BASOPHIL #: 0 10^3/UL (ref 0–0.1)
BASOPHILS %: 0.1 % (ref 0–2)
BLOOD UREA NITROGEN: 17 MG/DL (ref 7–20)
CALCIUM: 8.4 MG/DL (ref 8.4–10.2)
CARBON DIOXIDE: 28 MMOL/L (ref 21–31)
CHLORIDE: 107 MMOL/L (ref 97–110)
CREATININE: 0.68 MG/DL (ref 0.44–1)
EOSINOPHILS #: 0 10^3/UL (ref 0–0.5)
EOSINOPHILS %: 0 % (ref 0–7)
GLUCOSE: 256 MG/DL (ref 70–220)
HEMATOCRIT: 38.7 % (ref 37–47)
HEMOGLOBIN: 12.4 G/DL (ref 12–16)
IMMATURE GRANS #M: 0.09 10^3/UL (ref 0–0.03)
IMMATURE GRANS % (M): 0.5 % (ref 0–0.43)
LYMPHOCYTES #: 0.8 10^3/UL (ref 0.8–2.9)
LYMPHOCYTES %: 4.3 % (ref 15–51)
MEAN CORPUSCULAR HEMOGLOBIN: 28 PG (ref 29–33)
MEAN CORPUSCULAR HGB CONC: 32 G/DL (ref 32–37)
MEAN CORPUSCULAR VOLUME: 87.4 FL (ref 82–101)
MEAN PLATELET VOLUME: 11.2 FL (ref 7.4–10.4)
MONOCYTE #: 1.8 10^3/UL (ref 0.3–0.9)
MONOCYTES %: 10.1 % (ref 0–11)
NEUTROPHIL #: 15.3 10^3/UL (ref 1.6–7.5)
NEUTROPHILS %: 85 % (ref 39–77)
NUCLEATED RED BLOOD CELLS #: 0 10^3/UL (ref 0–0)
NUCLEATED RED BLOOD CELLS%: 0 /100WBC (ref 0–0)
PLATELET COUNT: 377 10^3/UL (ref 140–415)
POSITIVE DIFF: (no result)
POTASSIUM: 3.9 MMOL/L (ref 3.5–5.1)
RED BLOOD COUNT: 4.43 10^6/UL (ref 4.2–5.4)
RED CELL DISTRIBUTION WIDTH: 15.6 % (ref 11.5–14.5)
SODIUM: 145 MMOL/L (ref 135–144)
WHITE BLOOD COUNT: 18 10^3/UL (ref 4.8–10.8)

## 2019-01-09 RX ADMIN — LEVETIRACETAM 1 MLS/HR: 5 INJECTION INTRAVENOUS at 01:25

## 2019-01-09 RX ADMIN — HYDROCODONE BITARTRATE AND ACETAMINOPHEN 1 TAB: 10; 325 TABLET ORAL at 17:11

## 2019-01-09 RX ADMIN — ASCORBIC ACID, VITAMIN A PALMITATE, CHOLECALCIFEROL, THIAMINE HYDROCHLORIDE, RIBOFLAVIN-5 PHOSPHATE SODIUM, PYRIDOXINE HYDROCHLORIDE, NIACINAMIDE, DEXPANTHENOL, ALPHA-TOCOPHEROL ACETATE, VITAMIN K1, FOLIC ACID, BIOTIN, CYANOCOBALAMIN 1 MLS/HR: 200; 3300; 200; 6; 3.6; 6; 40; 15; 10; 150; 600; 60; 5 INJECTION, SOLUTION INTRAVENOUS at 01:25

## 2019-01-09 RX ADMIN — LEVETIRACETAM 1 MLS/HR: 5 INJECTION INTRAVENOUS at 22:20

## 2019-01-09 RX ADMIN — DOCUSATE SODIUM SCH MG: 100 CAPSULE, LIQUID FILLED ORAL at 11:05

## 2019-01-09 RX ADMIN — BACITRACIN ZINC NEOMYCIN SULFATE POLYMYXIN B SULFATE 1 APPLIC: 400; 3.5; 5 OINTMENT TOPICAL at 21:10

## 2019-01-09 RX ADMIN — DOCUSATE SODIUM 1 MG: 100 CAPSULE, LIQUID FILLED ORAL at 20:58

## 2019-01-09 RX ADMIN — HYDROMORPHONE HYDROCHLORIDE 1 MG: 1 INJECTION, SOLUTION INTRAMUSCULAR; INTRAVENOUS; SUBCUTANEOUS at 01:23

## 2019-01-09 RX ADMIN — DEXAMETHASONE SODIUM PHOSPHATE 1 MG: 4 INJECTION, SOLUTION INTRAMUSCULAR; INTRAVENOUS at 12:14

## 2019-01-09 RX ADMIN — BACITRACIN ZINC NEOMYCIN SULFATE POLYMYXIN B SULFATE 1 APPLIC: 400; 3.5; 5 OINTMENT TOPICAL at 12:14

## 2019-01-09 RX ADMIN — DEXAMETHASONE SODIUM PHOSPHATE SCH MG: 4 INJECTION, SOLUTION INTRAMUSCULAR; INTRAVENOUS at 05:58

## 2019-01-09 RX ADMIN — DOCUSATE SODIUM 1 MG: 100 CAPSULE, LIQUID FILLED ORAL at 11:05

## 2019-01-09 RX ADMIN — DEXAMETHASONE SODIUM PHOSPHATE SCH MG: 4 INJECTION, SOLUTION INTRAMUSCULAR; INTRAVENOUS at 17:20

## 2019-01-09 RX ADMIN — CEFAZOLIN 1 MLS/HR: 1 INJECTION, POWDER, FOR SOLUTION INTRAMUSCULAR; INTRAVENOUS at 04:05

## 2019-01-09 RX ADMIN — BACITRACIN ZINC NEOMYCIN SULFATE POLYMYXIN B SULFATE 1 APPLIC: 400; 3.5; 5 OINTMENT TOPICAL at 11:06

## 2019-01-09 RX ADMIN — PANTOPRAZOLE SODIUM SCH MG: 40 INJECTION, POWDER, FOR SOLUTION INTRAVENOUS at 05:59

## 2019-01-09 RX ADMIN — CEFAZOLIN SCH MLS/HR: 1 INJECTION, POWDER, FOR SOLUTION INTRAMUSCULAR; INTRAVENOUS at 12:12

## 2019-01-09 RX ADMIN — INSULIN ASPART 1 UNIT: 100 INJECTION, SOLUTION INTRAVENOUS; SUBCUTANEOUS at 17:17

## 2019-01-09 RX ADMIN — CEFAZOLIN 1 MLS/HR: 1 INJECTION, POWDER, FOR SOLUTION INTRAMUSCULAR; INTRAVENOUS at 12:12

## 2019-01-09 RX ADMIN — LEVETIRACETAM SCH MLS/HR: 5 INJECTION INTRAVENOUS at 22:20

## 2019-01-09 RX ADMIN — BACITRACIN ZINC NEOMYCIN SULFATE POLYMYXIN B SULFATE SCH APPLIC: 400; 3.5; 5 OINTMENT TOPICAL at 11:06

## 2019-01-09 RX ADMIN — DEXAMETHASONE SODIUM PHOSPHATE 1 MG: 4 INJECTION, SOLUTION INTRAMUSCULAR; INTRAVENOUS at 17:20

## 2019-01-09 RX ADMIN — BACITRACIN ZINC NEOMYCIN SULFATE POLYMYXIN B SULFATE SCH APPLIC: 400; 3.5; 5 OINTMENT TOPICAL at 12:14

## 2019-01-09 RX ADMIN — LEVETIRACETAM SCH MLS/HR: 5 INJECTION INTRAVENOUS at 01:25

## 2019-01-09 RX ADMIN — INSULIN ASPART 1 UNIT: 100 INJECTION, SOLUTION INTRAVENOUS; SUBCUTANEOUS at 12:17

## 2019-01-09 RX ADMIN — PANTOPRAZOLE SODIUM 1 MG: 40 INJECTION, POWDER, FOR SOLUTION INTRAVENOUS at 05:59

## 2019-01-09 RX ADMIN — DOCUSATE SODIUM SCH MG: 100 CAPSULE, LIQUID FILLED ORAL at 20:58

## 2019-01-09 RX ADMIN — DEXAMETHASONE SODIUM PHOSPHATE 1 MG: 4 INJECTION, SOLUTION INTRAMUSCULAR; INTRAVENOUS at 01:26

## 2019-01-09 RX ADMIN — DEXAMETHASONE SODIUM PHOSPHATE SCH MG: 4 INJECTION, SOLUTION INTRAMUSCULAR; INTRAVENOUS at 01:26

## 2019-01-09 RX ADMIN — INSULIN ASPART 1 UNIT: 100 INJECTION, SOLUTION INTRAVENOUS; SUBCUTANEOUS at 21:15

## 2019-01-09 RX ADMIN — CEFAZOLIN SCH MLS/HR: 1 INJECTION, POWDER, FOR SOLUTION INTRAMUSCULAR; INTRAVENOUS at 04:05

## 2019-01-09 RX ADMIN — LEVETIRACETAM 1 MLS/HR: 5 INJECTION INTRAVENOUS at 12:11

## 2019-01-09 RX ADMIN — CEFAZOLIN SCH MLS/HR: 1 INJECTION, POWDER, FOR SOLUTION INTRAMUSCULAR; INTRAVENOUS at 20:58

## 2019-01-09 RX ADMIN — LEVETIRACETAM SCH MLS/HR: 5 INJECTION INTRAVENOUS at 12:11

## 2019-01-09 RX ADMIN — BACITRACIN ZINC NEOMYCIN SULFATE POLYMYXIN B SULFATE SCH APPLIC: 400; 3.5; 5 OINTMENT TOPICAL at 21:10

## 2019-01-09 RX ADMIN — CEFAZOLIN 1 MLS/HR: 1 INJECTION, POWDER, FOR SOLUTION INTRAMUSCULAR; INTRAVENOUS at 20:58

## 2019-01-09 RX ADMIN — HYDROCODONE BITARTRATE AND ACETAMINOPHEN 1 TAB: 10; 325 TABLET ORAL at 11:10

## 2019-01-09 RX ADMIN — INSULIN ASPART 1 UNIT: 100 INJECTION, SOLUTION INTRAVENOUS; SUBCUTANEOUS at 18:00

## 2019-01-09 RX ADMIN — DEXAMETHASONE SODIUM PHOSPHATE SCH MG: 4 INJECTION, SOLUTION INTRAMUSCULAR; INTRAVENOUS at 12:14

## 2019-01-09 RX ADMIN — DEXAMETHASONE SODIUM PHOSPHATE 1 MG: 4 INJECTION, SOLUTION INTRAMUSCULAR; INTRAVENOUS at 05:58

## 2019-01-09 NOTE — NUR
ICU TRANSFER

RECEIVED PT FROM ICU, REPORT TAKEN FROM [facility] AT BEDSIDE, PT ORIENTED TO 
SURROUNDINGS, CALL LIGHT, UNIT ACTIVITIES, BED ALARM ENGAGED, DENIES PAIN, ABLE TO CALL FOR 
ASSIST

## 2019-01-09 NOTE — NUR
NUTRITION CONSULT:



S:  AWAKE, ALERT, OVERWEIGHT



O:  REGULAR  DIET

     GLUCOSE  134(1/7)  >  256  (1/9)

     DECADRON  20 MG>  4 MG,    INSULIN LANTUS 10 UNITS,   ASPART MILD ALGO

     COLOSTOMY



A:  AT HOME,  GOOD APPETITE, AVOIDS RED MEAT, COWS MILK,

     INTERMITTENT VITAMIN INTAKE,  NO OTHER ORAL SUPPLEMENT

     TAKES STOOL SOFTENER TO KEEP STOOL SOFT



P:  CONSIDER 1600 OSCAR  CARB CONTROLLED DIET WHEN ON

     STEROIDS. NUTRITION EDUCATION PROVIDED. 

     FOOD LIKES/DISLIKES NOTED AND WILL BE FOLLOWED.

## 2019-01-09 NOTE — NUR
PT reevaluation



Therapy day number 

1

Evaluation Start Time 

14:25

Evaluation End Time 

15:10

Evaluation Total Time 

45 min

Subjective 

Current complaint of pain

Pain Scale

FLACC

Pain Intensity

3 (0-10)

Patient Stated Goal for Pain Relief 

0 (0-10)

Pain Level Comment 

headache

Pre Treatment Vital Signs Stable 

Yes

Transfer Training Start Time 

15:10

Supine to Sit 

Supervised

Transfer Sit to Stand Ability

Contact Guard Assist

Bed Mobility Sit to Supine

Supervised

Bed Transfer Ability

Contact Guard Assist

Chair Transfer Ability

Contact Guard Assist

Transfer Training End Time 

15:33

Total Transfer Training Time 

23 min (8-127)

Gait Assist Levels 

Contact Guard Assist

Assistive Devices 

None

Front Wheel Walker

Ambulation Distance

100 feet

Additional Gait Comments 

ambulated 70' with no device, CGA, 30' with FWW

Static Sitting Balance 

Good

Dynamic Sitting Balance 

Good

Standing Static Balance 

Fair

Dynamic Standing Balance 

Fair

Additional Balance Assessments Comments 

improved with use of FWW

Safety Judgement 

Good

Activity Tolerance 

Fair

Equipment Present 

A pump

Reyes Catheter

IV pump

Additional Equipment Present 

ICU monitoring, colostomy

Post Treatment Pain Intensity 

0 0-10

Additional Post Treatment Comment 

See note

Total Treatment Time 

23 min (8-127)

Total Minutes 

68

Total Units 

5

PT Technical Record Comment 

57 yo female presents with word find difficulties and speech limitations.  

Brain MRI indicates 3.5cm x 3.3 cm hyperintensity mass likely hemorrhagic 

metastatic with large surrounding vasogenic edema in the L parietotemporal 

lobe with 5 mm midline shift to the R. Now s/p L parietal craniotomy 

1/8/19. 



PMH: metastatic colorectal cancer with multiple lung and liver metastasis



Precautions: fall risk



PLOF: Patient lives in [facility] with family, ambulates independently with no DME



S: Patient in bed, agreeable to PT evaluation, family present at bedside. 

Coordinated care with RN. 



O:PT evaluation completed, pt returned back to bed following therapy 

intervention with call light within reach and all needs met. Spoke to Son 

(Wilfredo) at length regarding pt response to activity and observations from 

physical therapy evaluation. Spoke to RN regarding concerns and 

recommended ST evaluation to assist in determining problem solving and 

word finding deficits. RN to follow up. Vitals stable throughout and 

despite no pain, dizziness, or shortness of breath reported throughout, pt 

reported "wobbley". Strength, ROM, and coordination testing appear intact. 

Unable to clearly assess peripheral vision to R due to understanding of 

test. Mild limitation of smooth pursuit R however unsure if limiation due 

to vision, motor planning, or ability to follow instruction. 



A: Patient presents with fair mobility throughout, with no observable 

strength or coordination disparities bilaterally. Patient ambulates with 

high guard gait, slow tamra, and shuffling steps without AD, with 

introduction of FWW, pt reports "much better" however no change in tamra 

or stride length. Per son, conversations and word finding have improved 

since procedure but continues to notice limitations throughout. Family 

anxious throughout however receptive to PT education and recommendations. 

No signs of visual neglect noted, with no reported diplopia however 

encouraged family to approach patient on R side to further encourage 

awareness.  Patient could benefit from skilled inpatient PT to improve 

balance, safety, and functional mobility



P: Progress gait with FWW, decrease UE support and overall gait speed



Recommendation: home with family assist and HHPT when cleared by SHAMEKA RAYMOND 

to be assessed with progress, pt may benefit from ST cognitive evaluation 

to assess word finding and problem solving.

## 2019-01-09 NOTE — PN
DATE:  01/08/2019

 

 

SUBJECTIVE:  Patient is alert and oriented to place.  Daughter at the bedside.

 

OBJECTIVE:

VITAL SIGNS:  Stable.

HEENT:  Extraocular muscles intact.  Pupils equal, reactive to light bilaterally.  Sclerae are anicte
yogesh.  Oropharynx clear and moist.

NECK:  Supple, no JVD, no carotid bruits.

CHEST:  Lungs clear to auscultation bilaterally.

HEART:  Regular rate and rhythm.  No murmurs or gallops.

ABDOMEN:  Soft, nontender, nondistended.  Normoactive bowel sounds.

EXTREMITIES:  No clubbing, cyanosis, or edema.

NEUROLOGIC:  Grossly nonfocal.

 

ASSESSMENT AND PLAN:  

1.  Altered mental status, mainly manifested by amnesia.

2.  Widely metastatic colon cancer.

3.  Metastatic cerebral lesion.

 

PLAN:  Proceed with a craniotomy and resection of the lesion.  The case was discussed with  _____,
 the oncologist.  She recommends resection of the mass.

 

 

Dictated By: STACY MONTERROSO/GET

DD:    01/08/2019 12:55:44

DT:    01/08/2019 14:38:16

Conf#: [id number]

DID#:  [id number]

## 2019-01-09 NOTE — NUR
REPORT GIVEN



REPORT WAS GIVEN TO ZACHERY NARAYAN.  PT IS IN STABLE CONDITION.  PT IS ON ROOM AIR WITH NO 
RESPIRATORY DISTRESS.  PT IS AO4 AND RESTING WELL IN BED.  PT WAS ABLE TO TOLERATE PT AND 
WALKED ABOUT 60 FEET TODAY.  PT IS TOLERATING HER MEALS.  PT HAS BEEN IN SR.  PT SBP HAS 
BEEN BETWEEN 120-140.  NO SIGNIFICANT EVENTS OCCURRED.  ALL OF PT NEEDS WERE CARED FOR.  
ENDORSING CARE TO ZACHERY NARAYAN.

## 2019-01-09 NOTE — NUR
END OF SHIFT

FAMILY AT BEDSIDE, PT AWAKE AND A/OX4, DENIES PAIN, BED ALARM ENGAGED, CALL LIGHT WITHIN 
REACH, PER ASSESSMENT PT CURRENTLY STABLE FOR HANDOFF

## 2019-01-09 NOTE — NUR
EOSS



Received pt from OR. Patient showed improved mental status, no s/s of bleeding. Vitals are 
stable, with SR and afebrile. Patient was able to transition down to room air with O2 sat> 
93%. Pt still currently NPO, no BM, and good urine output. Dietary consult placed, pending 
MRI in the morning. Patient tolerating ice chips. Patient also received Dilaudid x1 to 
assist with pain from surgical site. All needs were tended to, care plans updated and 
performed.

## 2019-01-09 NOTE — PN
Date/Time of Note


Date/Time of Note


DATE: 1/9/19 


TIME: 19:35





Assessment/Plan


VTE Prophylaxis


Risk score (from Ns)>0 risk:  10


[place] [facility] [place]


Pharmacological prophylaxis:  NA/contraindicated


Pharm contraindication:  surgical contra





Lines/Catheters


IV Catheter Type (from [facility]):  Saline Lock


Urinary Cath still in place:  No





Assessment/Plan


Assessment/Plan


Date of progress note: 1/9/2019





The patient is postop day 1 status post stereotactic left parietal craniotomy 


for resection of intraparenchymal tumor.  The patient has already been 


transferred out of the ICU.  Her son is at bedside.  She is awake alert and 


oriented to her name and place.  Her language is at her preoperative baseline.  


She is able to move her upper and lower extremities to command well.  Her left 


frontoparietal incision is clean dry and intact with Neosporin ointment in 


place.





The patient's postop CT of the head and MRI show gross total resection of her 


tumor.  The permanent pathology results are pending.  Depending on the pathology


results, further treatment plans will be formulated by the oncology and possible


radiation oncology service.  From a neurosurgery perspective, the patient may be


discharged from the hospital tomorrow.  She will receive physical therapy today.


 The patient is to follow-up with me in clinic in 2-3 weeks.  Her staples can be


removed on postop day 14.  The patient and her family are to apply the Neosporin


ointment twice a day to her incision line.  Activities as tolerated.  She may 


shower starting tomorrow but is to keep her incision line clean dry and intact 


after shower.  The Decadron can be tapered down over the next 2 weeks.


Result Diagram:  


1/9/19 0428                                                                     


          1/9/19 0428





Results 24hrs





Laboratory Tests


 Test
                                1/9/19
04:28  1/9/19
12:10  1/9/19
17:10


 White Blood Count                         18.0  H


 Red Blood Count                            4.43


 Hemoglobin                                 12.4


 Hematocrit                                 38.7


 Mean Corpuscular Volume                    87.4


 Mean Corpuscular Hemoglobin               28.0  L


 Mean Corpuscular Hemoglobin
Concent       32.0  
  
             



 Red Cell Distribution Width               15.6  H


 Platelet Count                              377


 Mean Platelet Volume                      11.2  H


 Immature Granulocytes %                  0.500  H


 Neutrophils %                             85.0  H


 Lymphocytes %                              4.3  L


 Monocytes %                                10.1


 Eosinophils %                               0.0


 Basophils %                                 0.1


 Nucleated Red Blood Cells %                 0.0


 Immature Granulocytes #                  0.090  H


 Neutrophils #                             15.3  H


 Lymphocytes #                               0.8


 Monocytes #                                1.8  H


 Eosinophils #                               0.0


 Basophils #                                 0.0


 Nucleated Red Blood Cells #                 0.0


 Sodium Level                               145  H


 Potassium Level                             3.9


 Chloride Level                              107


 Carbon Dioxide Level                         28


 Anion Gap                                    10


 Blood Urea Nitrogen                          17


 Creatinine                                 0.68


 Est Glomerular Filtrat Rate
mL/min   > 60  
       
             



 Glucose Level                              256  H


 Calcium Level                               8.4


 Bedside Glucose                                           177           170














KELSIE CINTRON MD                Jan 9, 2019 19:35

## 2019-01-10 VITALS — HEART RATE: 59 BPM

## 2019-01-10 VITALS — HEART RATE: 61 BPM | SYSTOLIC BLOOD PRESSURE: 118 MMHG | DIASTOLIC BLOOD PRESSURE: 56 MMHG | RESPIRATION RATE: 20 BRPM

## 2019-01-10 VITALS — DIASTOLIC BLOOD PRESSURE: 59 MMHG | SYSTOLIC BLOOD PRESSURE: 125 MMHG | HEART RATE: 57 BPM | RESPIRATION RATE: 16 BRPM

## 2019-01-10 VITALS — RESPIRATION RATE: 17 BRPM | DIASTOLIC BLOOD PRESSURE: 56 MMHG | HEART RATE: 53 BPM | SYSTOLIC BLOOD PRESSURE: 116 MMHG

## 2019-01-10 VITALS — DIASTOLIC BLOOD PRESSURE: 62 MMHG | HEART RATE: 67 BPM | RESPIRATION RATE: 20 BRPM | SYSTOLIC BLOOD PRESSURE: 134 MMHG

## 2019-01-10 VITALS — HEART RATE: 64 BPM

## 2019-01-10 RX ADMIN — DEXAMETHASONE SODIUM PHOSPHATE SCH MG: 4 INJECTION, SOLUTION INTRAMUSCULAR; INTRAVENOUS at 05:48

## 2019-01-10 RX ADMIN — PANTOPRAZOLE SODIUM 1 MG: 40 INJECTION, POWDER, FOR SOLUTION INTRAVENOUS at 05:48

## 2019-01-10 RX ADMIN — DEXAMETHASONE SODIUM PHOSPHATE 1 MG: 4 INJECTION, SOLUTION INTRAMUSCULAR; INTRAVENOUS at 00:06

## 2019-01-10 RX ADMIN — BACITRACIN ZINC NEOMYCIN SULFATE POLYMYXIN B SULFATE SCH APPLIC: 400; 3.5; 5 OINTMENT TOPICAL at 08:01

## 2019-01-10 RX ADMIN — LEVETIRACETAM 1 MLS/HR: 5 INJECTION INTRAVENOUS at 08:00

## 2019-01-10 RX ADMIN — INSULIN GLARGINE 1 UNITS: 100 INJECTION, SOLUTION SUBCUTANEOUS at 08:06

## 2019-01-10 RX ADMIN — DEXAMETHASONE SODIUM PHOSPHATE 1 MG: 4 INJECTION, SOLUTION INTRAMUSCULAR; INTRAVENOUS at 12:11

## 2019-01-10 RX ADMIN — HYDROCODONE BITARTRATE AND ACETAMINOPHEN 1 TAB: 10; 325 TABLET ORAL at 00:12

## 2019-01-10 RX ADMIN — BACITRACIN ZINC NEOMYCIN SULFATE POLYMYXIN B SULFATE 1 APPLIC: 400; 3.5; 5 OINTMENT TOPICAL at 08:01

## 2019-01-10 RX ADMIN — BACITRACIN ZINC NEOMYCIN SULFATE POLYMYXIN B SULFATE 1 APPLIC: 400; 3.5; 5 OINTMENT TOPICAL at 12:30

## 2019-01-10 RX ADMIN — LEVETIRACETAM SCH MLS/HR: 5 INJECTION INTRAVENOUS at 08:00

## 2019-01-10 RX ADMIN — DEXAMETHASONE SODIUM PHOSPHATE SCH MG: 4 INJECTION, SOLUTION INTRAMUSCULAR; INTRAVENOUS at 12:11

## 2019-01-10 RX ADMIN — DOCUSATE SODIUM 1 MG: 100 CAPSULE, LIQUID FILLED ORAL at 08:00

## 2019-01-10 RX ADMIN — DEXAMETHASONE SODIUM PHOSPHATE SCH MG: 4 INJECTION, SOLUTION INTRAMUSCULAR; INTRAVENOUS at 00:06

## 2019-01-10 RX ADMIN — DOCUSATE SODIUM SCH MG: 100 CAPSULE, LIQUID FILLED ORAL at 08:00

## 2019-01-10 RX ADMIN — INSULIN ASPART 1 UNIT: 100 INJECTION, SOLUTION INTRAVENOUS; SUBCUTANEOUS at 12:30

## 2019-01-10 RX ADMIN — INSULIN ASPART 1 UNIT: 100 INJECTION, SOLUTION INTRAVENOUS; SUBCUTANEOUS at 08:05

## 2019-01-10 RX ADMIN — PANTOPRAZOLE SODIUM SCH MG: 40 INJECTION, POWDER, FOR SOLUTION INTRAVENOUS at 05:48

## 2019-01-10 RX ADMIN — DEXAMETHASONE SODIUM PHOSPHATE 1 MG: 4 INJECTION, SOLUTION INTRAMUSCULAR; INTRAVENOUS at 05:48

## 2019-01-10 RX ADMIN — BACITRACIN ZINC NEOMYCIN SULFATE POLYMYXIN B SULFATE SCH APPLIC: 400; 3.5; 5 OINTMENT TOPICAL at 12:30

## 2019-01-10 RX ADMIN — HYDROCODONE BITARTRATE AND ACETAMINOPHEN 1 TAB: 10; 325 TABLET ORAL at 05:53

## 2019-01-10 NOTE — NUR
Patient cleared for discharge home, vitals stable, denies pain, A/Ox4, PERRLA, on room air 
sating 97%, bilateral lung sounds present and clear in all areas, no shortness of breath, 
cough, nausea, vomiting, weakness, chills, or fever, abdomen nondistended, no tenderness, 
bowel sound x4 active, continentx2, BM yesterday, ambulatory with assist, skin intact, 
craniotomy incision intact with staples and open to air, IV discontinued, telemetry monitor 
discontinued and returned to nursing station, discharge packet reviewed and signed, 
prescriptions given to patient, belongings returned to patient, patient escorted off unit by 
family and volunteer via wheelchair.

## 2019-01-10 NOTE — NUR
CM NOTE

ORDER FOR DISCHARGE WITH HOME HEALTH AND IV ABX ON THE CHART, PT IS UNDER [facility] AND YOKASTA LLOYD 
OF [facility] WILL MAKE ALL THE NECESSARY ARRANGEMENT

[phone], CM WILL REMAIN AVAILABLE.

## 2019-01-10 NOTE — PN
DATE:  01/09/2019

 

 

PHYSICAL EXAMINATION

GENERAL:  Patient is the lethargic but easily arousable.  She complains of mild headache.

VITAL SIGNS:  Stable.  She is afebrile.

NECK:  Supple.  No JVD or carotid bruits.

CHEST:  Lungs clear to auscultation bilaterally.

CARDIAC:  Regular rate and rhythm.  No murmurs, rubs or gallop.

ABDOMEN:  Soft, nontender, nondistended.  Normoactive bowel sounds.

EXTREMITIES:  No clubbing, cyanosis, or edema.

NEUROLOGIC:  Moves all extremities.  Cranial nerves II through XII are intact.

 

ASSESSMENT:

1.  A 58-year-old female with metastatic brain lesion.  

2.  Status post craniectomy and resection of metastatic lesion.

3.  Altered mental status.

4.  Widely metastatic colon cancer.

 

PLAN:  

1.  Proceed with MRI of brain. 

2.  Transferred to telemetry.

3.  Continue physical therapy.  

4.  Neurosurgical and oncology followup.

 

The case was discussed with Dr. Barraza.

 

 

Dictated By: STACY MONTERROSO/GET

DD:    01/09/2019 10:18:33

DT:    01/09/2019 11:10:47

Conf#: [id number]

DID#:  [id number]

## 2019-01-11 NOTE — DS
DATE OF ADMISSION: 01/05/2019

DATE OF DISCHARGE: 01/10/2019

 

DISCHARGE DIAGNOSES:

1.  A 58-year-old female with widely metastatic colon cancer.

2.  Metastatic brain lesion.

3.  Status post craniectomy and removal of the brain lesion.

 

HOSPITAL COURSE:  A 58-year-old female with known history of widely metastatic colon cancer, presente
d to emergency room with complaint of altered mental status.  The patient was found to be somewhat fo
rgetful.  There were no motor or sensory deficits.  She was seen in consultation by oncology and neur
osurgery.  The decision was to proceed with craniotomy and resection of the brain mass.  The patient 
underwent left temporal region brain mass resection.  Postoperative MRI showed partial lesion resecti
on.  The patient remained neurologically stable during the hospitalization.  There was mild improveme
nt of surrounding vasogenic edema and mass effect.  Case was discussed with Dr. Cintron.  The patient w
as cleared for discharge.  She was placed on oral ____ with taper for a period of 3 weeks as well as 
Protonix.  The patient will follow up with PCP, Dr. Madi Dr. ____ as outpatient.

 

 

Dictated By: STACY LOVE MD

 

SK/NTS

DD:    01/10/2019 10:43:36

DT:    01/10/2019 13:39:36

Conf#: [id number]

DID#:  [id number]

CC: KELSIE CINTRON MD; ABHILASH BECKFORD MD;*[facility]*

## 2019-03-12 ENCOUNTER — HOSPITAL ENCOUNTER (EMERGENCY)
Dept: HOSPITAL 91 - FTE | Age: 59
Discharge: HOME | End: 2019-03-12
Payer: COMMERCIAL

## 2019-03-12 ENCOUNTER — HOSPITAL ENCOUNTER (EMERGENCY)
Dept: HOSPITAL 10 - FTE | Age: 59
Discharge: HOME | End: 2019-03-12
Payer: COMMERCIAL

## 2019-03-12 VITALS — DIASTOLIC BLOOD PRESSURE: 82 MMHG | RESPIRATION RATE: 19 BRPM | SYSTOLIC BLOOD PRESSURE: 146 MMHG | HEART RATE: 98 BPM

## 2019-03-12 VITALS
HEIGHT: 64 IN | BODY MASS INDEX: 27.1 KG/M2 | BODY MASS INDEX: 27.1 KG/M2 | HEIGHT: 64 IN | WEIGHT: 158.73 LBS | WEIGHT: 158.73 LBS

## 2019-03-12 DIAGNOSIS — Z85.9: ICD-10-CM

## 2019-03-12 DIAGNOSIS — N30.01: Primary | ICD-10-CM

## 2019-03-12 LAB
URINE BLOOD (DIP) POC: (no result)
URINE LEUKOCYTE EST (DIP) POC: (no result)
URINE PH (DIP) POC: 6 (ref 5–8.5)
URINE TOTAL PROTEIN POC: (no result)

## 2019-03-12 PROCEDURE — 81003 URINALYSIS AUTO W/O SCOPE: CPT

## 2019-03-12 PROCEDURE — 99284 EMERGENCY DEPT VISIT MOD MDM: CPT

## 2019-03-12 PROCEDURE — 96372 THER/PROPH/DIAG INJ SC/IM: CPT

## 2019-03-12 RX ADMIN — CEFTRIAXONE SODIUM 1 GM: 1 INJECTION, POWDER, FOR SOLUTION INTRAMUSCULAR; INTRAVENOUS at 19:55

## 2019-03-12 NOTE — ERD
ER Documentation


Chief Complaint


Chief Complaint





Blood in urine, dysuria X 6 days





HPI


This patient is a 58-year-old female with past medical history of metastatic 


cancer presenting to the emergency department complaints of dysuria and 


hematuria intermittently for the past 2 days.  Symptoms are mild to moderate in 


severity.  Patient denies any back pain or fevers or chills or other symptoms at


this time.





ROS


All systems reviewed and are negative except as per history of present illness.





Medications


Home Meds


Active Scripts


Cephalexin* (Keflex*) 500 Mg Capsule, 500 MG PO TID for 7 Days, CAP


   Prov:MARLEN FENG PA-C         3/12/19


Reported Medications


Pantoprazole* (Protonix*) 40 Mg Tablet.dr, 40 MG PO DAILY for 30 Days, #30 TAB


   1/10/19





Allergies


Allergies:  


Coded Allergies:  


     No Known Allergy (Unverified , 1/5/19)





PMhx/Soc


History of Surgery:  Yes (ILEOSTOMY )


Anesthesia Reaction:  No


Hx Neurological Disorder:  No


Hx Respiratory Disorders:  No


Hx Cardiac Disorders:  No


Hx Psychiatric Problems:  No


Hx Miscellaneous Medical Probl:  Yes (COLOSTOMY X 2 YEARS)


Hx Alcohol Use:  No


Hx Substance Use:  No


Hx Tobacco Use:  No


Smoking Status:  Never smoker





FmHx


Family History:  No diabetes





Physical Exam


Vitals





Vital Signs


  Date      Temp  Pulse  Resp  B/P (MAP)   Pulse Ox  O2          O2 Flow    FiO2


Time                                                 Delivery    Rate


   3/12/19  99.3    115    18      186/92        99


     17:53                          (123)


   3/12/19  98.8    113    18      152/92        97


     16:51                          (112)





Physical Exam


Const:   No acute distress


Head:   Atraumatic 


Eyes:    Normal Conjunctiva


ENT:    Normal External Ears, Nose and Mouth.


Neck:               Full range of motion. No meningismus.


Resp:   Clear to auscultation bilaterally


Cardio:   Regular rate and rhythm, no murmurs


Abd:    Soft, non tender, non distended. Normal bowel sounds.  No rebound 


tenderness or guarding.  There is a colostomy bag in place with stool present.  


No McBurney's point tenderness.


Skin:   No petechiae or rashes


Back:   No midline or flank tenderness.  No CVA tenderness.


Ext:    No cyanosis, or edema


Neur:   Awake and alert


Psych:    Normal Mood and Affect


Results 24 hrs





Laboratory Tests


               Test
                                3/12/19
19:39


               Bedside Urine pH (LAB)                        6.0


               Bedside Urine Protein (LAB)                    2+


               Bedside Urine Glucose (UA)           Negative


               Bedside Urine Ketones (LAB)          Negative


               Bedside Urine Blood                            3+


               Bedside Urine Nitrite (LAB)          Negative


               Bedside Urine Leukocyte
Esterase (L           1+ 






Current Medications


 Medications
   Dose
          Sig/Gold
       Start Time
   Status  Last


 (Trade)       Ordered        Route
 PRN     Stop Time              Admin
Dose


                              Reason                                Admin


 Ceftriaxone    1 gm           ONCE  ONCE
    3/12/19                    3/12/19


Sodium
                       IM
            20:00
                       19:55



(Rocephin)                                   3/12/19 20:01








Procedures/MDM


58-year-old female presenting emergency department with signs, symptoms, urine 


dip results most consistent with urinary tract infection.  Patient is nontoxic 


and well-appearing.  Low suspicion for sepsis.  Suspicion for pyonephritis.  


Patient was administered Rocephin in the department for UTI and she will be 


discharged home with a prescription for Keflex.  The patient agreed with the 


diagnosis, plan, need for follow-up, return precautions.  All questions and 


concerns were addressed prior to discharge.





Departure


Diagnosis:  


   Primary Impression:  


   UTI (urinary tract infection)


   Urinary tract infection type:  acute cystitis  Hematuria presence:  with 


   hematuria  Qualified Codes:  N30.01 - Acute cystitis with hematuria


Condition:  Fair


Patient Instructions:  Understanding Urinary Tract Infections (UTIs)





Additional Instructions:  


Llame al doctor MAANA y maddie justin RISHI PARA DENTRO DE 1-2 GUZMAN.Dgale a la 


secretaria que nosotros le instruimos hacer esta rishi.Avise o llame si perez 


condicin se empeora antes de la rishi. Regresa aqui si peor o no mejor.











MARLEN FENG PA-C       Mar 12, 2019 20:02